# Patient Record
Sex: FEMALE | Race: WHITE | NOT HISPANIC OR LATINO | Employment: UNEMPLOYED | URBAN - METROPOLITAN AREA
[De-identification: names, ages, dates, MRNs, and addresses within clinical notes are randomized per-mention and may not be internally consistent; named-entity substitution may affect disease eponyms.]

---

## 2017-05-22 ENCOUNTER — APPOINTMENT (OUTPATIENT)
Dept: LAB | Facility: CLINIC | Age: 37
End: 2017-05-22
Payer: COMMERCIAL

## 2017-05-22 ENCOUNTER — ALLSCRIPTS OFFICE VISIT (OUTPATIENT)
Dept: OTHER | Facility: OTHER | Age: 37
End: 2017-05-22

## 2017-05-22 DIAGNOSIS — K30 FUNCTIONAL DYSPEPSIA: ICD-10-CM

## 2017-05-22 DIAGNOSIS — K76.9 LIVER DISEASE: ICD-10-CM

## 2017-05-22 LAB
ALBUMIN SERPL BCP-MCNC: 3.8 G/DL (ref 3.5–5)
ALP SERPL-CCNC: 70 U/L (ref 46–116)
ALT SERPL W P-5'-P-CCNC: 20 U/L (ref 12–78)
ANION GAP SERPL CALCULATED.3IONS-SCNC: 11 MMOL/L (ref 4–13)
AST SERPL W P-5'-P-CCNC: 14 U/L (ref 5–45)
BACTERIA UR QL AUTO: NORMAL /HPF
BASOPHILS # BLD AUTO: 0.04 THOUSANDS/ΜL (ref 0–0.1)
BASOPHILS NFR BLD AUTO: 1 % (ref 0–1)
BILIRUB SERPL-MCNC: 0.22 MG/DL (ref 0.2–1)
BILIRUB UR QL STRIP: NEGATIVE
BUN SERPL-MCNC: 12 MG/DL (ref 5–25)
CALCIUM SERPL-MCNC: 9.4 MG/DL (ref 8.3–10.1)
CHLORIDE SERPL-SCNC: 108 MMOL/L (ref 100–108)
CLARITY UR: CLEAR
CO2 SERPL-SCNC: 24 MMOL/L (ref 21–32)
COLOR UR: YELLOW
CREAT SERPL-MCNC: 0.46 MG/DL (ref 0.6–1.3)
EOSINOPHIL # BLD AUTO: 0.13 THOUSAND/ΜL (ref 0–0.61)
EOSINOPHIL NFR BLD AUTO: 2 % (ref 0–6)
ERYTHROCYTE [DISTWIDTH] IN BLOOD BY AUTOMATED COUNT: 16.6 % (ref 11.6–15.1)
GFR SERPL CREATININE-BSD FRML MDRD: >60 ML/MIN/1.73SQ M
GLUCOSE SERPL-MCNC: 82 MG/DL (ref 65–140)
GLUCOSE UR STRIP-MCNC: NEGATIVE MG/DL
HCT VFR BLD AUTO: 34.2 % (ref 34.8–46.1)
HGB BLD-MCNC: 10.6 G/DL (ref 11.5–15.4)
HGB UR QL STRIP.AUTO: ABNORMAL
KETONES UR STRIP-MCNC: NEGATIVE MG/DL
LEUKOCYTE ESTERASE UR QL STRIP: NEGATIVE
LYMPHOCYTES # BLD AUTO: 2.8 THOUSANDS/ΜL (ref 0.6–4.47)
LYMPHOCYTES NFR BLD AUTO: 47 % (ref 14–44)
MCH RBC QN AUTO: 23.8 PG (ref 26.8–34.3)
MCHC RBC AUTO-ENTMCNC: 31 G/DL (ref 31.4–37.4)
MCV RBC AUTO: 77 FL (ref 82–98)
MONOCYTES # BLD AUTO: 0.49 THOUSAND/ΜL (ref 0.17–1.22)
MONOCYTES NFR BLD AUTO: 8 % (ref 4–12)
NEUTROPHILS # BLD AUTO: 2.48 THOUSANDS/ΜL (ref 1.85–7.62)
NEUTS SEG NFR BLD AUTO: 42 % (ref 43–75)
NITRITE UR QL STRIP: NEGATIVE
NON-SQ EPI CELLS URNS QL MICRO: NORMAL /HPF
NRBC BLD AUTO-RTO: 0 /100 WBCS
PH UR STRIP.AUTO: 5.5 [PH] (ref 4.5–8)
PLATELET # BLD AUTO: 279 THOUSANDS/UL (ref 149–390)
PMV BLD AUTO: 11.4 FL (ref 8.9–12.7)
POTASSIUM SERPL-SCNC: 3.6 MMOL/L (ref 3.5–5.3)
PROT SERPL-MCNC: 7.7 G/DL (ref 6.4–8.2)
PROT UR STRIP-MCNC: NEGATIVE MG/DL
RBC # BLD AUTO: 4.45 MILLION/UL (ref 3.81–5.12)
RBC #/AREA URNS AUTO: NORMAL /HPF
SODIUM SERPL-SCNC: 143 MMOL/L (ref 136–145)
SP GR UR STRIP.AUTO: 1.03 (ref 1–1.03)
TSH SERPL DL<=0.05 MIU/L-ACNC: 0.85 UIU/ML (ref 0.36–3.74)
UROBILINOGEN UR QL STRIP.AUTO: 0.2 E.U./DL
WBC # BLD AUTO: 5.95 THOUSAND/UL (ref 4.31–10.16)
WBC #/AREA URNS AUTO: NORMAL /HPF

## 2017-05-22 PROCEDURE — 80053 COMPREHEN METABOLIC PANEL: CPT

## 2017-05-22 PROCEDURE — 36415 COLL VENOUS BLD VENIPUNCTURE: CPT

## 2017-05-22 PROCEDURE — 85025 COMPLETE CBC W/AUTO DIFF WBC: CPT

## 2017-05-22 PROCEDURE — 84443 ASSAY THYROID STIM HORMONE: CPT

## 2017-05-22 PROCEDURE — 81001 URINALYSIS AUTO W/SCOPE: CPT

## 2017-05-30 ENCOUNTER — HOSPITAL ENCOUNTER (OUTPATIENT)
Dept: ULTRASOUND IMAGING | Facility: CLINIC | Age: 37
Discharge: HOME | End: 2017-05-30
Payer: COMMERCIAL

## 2017-05-30 DIAGNOSIS — K30 FUNCTIONAL DYSPEPSIA: ICD-10-CM

## 2017-05-30 PROCEDURE — 76705 ECHO EXAM OF ABDOMEN: CPT

## 2017-06-01 ENCOUNTER — TRANSCRIBE ORDERS (OUTPATIENT)
Dept: ADMINISTRATIVE | Facility: HOSPITAL | Age: 37
End: 2017-06-01

## 2017-06-01 DIAGNOSIS — K76.0 FATTY METAMORPHOSIS OF LIVER: Primary | ICD-10-CM

## 2017-06-05 ENCOUNTER — ALLSCRIPTS OFFICE VISIT (OUTPATIENT)
Dept: OTHER | Facility: OTHER | Age: 37
End: 2017-06-05

## 2017-06-19 ENCOUNTER — HOSPITAL ENCOUNTER (OUTPATIENT)
Dept: MRI IMAGING | Facility: CLINIC | Age: 37
Discharge: HOME | End: 2017-06-19
Payer: COMMERCIAL

## 2017-08-08 ENCOUNTER — GENERIC CONVERSION - ENCOUNTER (OUTPATIENT)
Dept: OTHER | Facility: OTHER | Age: 37
End: 2017-08-08

## 2018-01-14 VITALS
WEIGHT: 152.25 LBS | HEIGHT: 66 IN | BODY MASS INDEX: 24.47 KG/M2 | HEART RATE: 60 BPM | SYSTOLIC BLOOD PRESSURE: 100 MMHG | DIASTOLIC BLOOD PRESSURE: 60 MMHG

## 2018-01-14 VITALS
WEIGHT: 155.13 LBS | HEART RATE: 68 BPM | SYSTOLIC BLOOD PRESSURE: 90 MMHG | OXYGEN SATURATION: 95 % | BODY MASS INDEX: 24.35 KG/M2 | TEMPERATURE: 97.7 F | DIASTOLIC BLOOD PRESSURE: 66 MMHG | HEIGHT: 67 IN

## 2018-11-06 ENCOUNTER — OFFICE VISIT (OUTPATIENT)
Dept: INTERNAL MEDICINE CLINIC | Facility: CLINIC | Age: 38
End: 2018-11-06
Payer: COMMERCIAL

## 2018-11-06 VITALS
TEMPERATURE: 98.2 F | HEART RATE: 76 BPM | RESPIRATION RATE: 12 BRPM | WEIGHT: 159.8 LBS | SYSTOLIC BLOOD PRESSURE: 128 MMHG | BODY MASS INDEX: 25.79 KG/M2 | DIASTOLIC BLOOD PRESSURE: 78 MMHG | OXYGEN SATURATION: 99 %

## 2018-11-06 DIAGNOSIS — M79.641 PAIN OF RIGHT HAND: ICD-10-CM

## 2018-11-06 DIAGNOSIS — M54.10 RADICULOPATHY, UNSPECIFIED SPINAL REGION: Primary | ICD-10-CM

## 2018-11-06 PROBLEM — K30 NON-ULCER DYSPEPSIA: Status: ACTIVE | Noted: 2017-05-22

## 2018-11-06 PROBLEM — M54.32 SCIATICA OF LEFT SIDE: Status: ACTIVE | Noted: 2018-11-06

## 2018-11-06 PROCEDURE — 99214 OFFICE O/P EST MOD 30 MIN: CPT | Performed by: PHYSICIAN ASSISTANT

## 2018-11-06 PROCEDURE — 1036F TOBACCO NON-USER: CPT | Performed by: PHYSICIAN ASSISTANT

## 2018-11-06 NOTE — PROGRESS NOTES
Assessment/Plan:  Right chest wall pain and right shoulder and arm pain only when she lies down at night physical exam is unremarkable possibly radicular will x-ray her spine  She has lab work pending from her 's cardiologist 2 week follow-up  Physical exam is unremarkable recommend ibuprofen or Tylenol at night consider changing her mattress and pillow  X-ray right hand because what could be a ganglion right proximal 1st metacarpal       Diagnoses and all orders for this visit:    Radiculopathy, unspecified spinal region  -     XR spine cervical complete 4 or 5 vw non injury; Future  -     XR spine thoracic 3 vw; Future  -     XR hand 3+ vw right; Future    Pain of right hand  -     XR spine cervical complete 4 or 5 vw non injury; Future  -     XR spine thoracic 3 vw; Future  -     XR hand 3+ vw right; Future        No problem-specific Assessment & Plan notes found for this encounter  Subjective:      Patient ID: Kieran Gonzalez is a 41 y o  female  She has had chest pain right side mid back under her right breast when she lies down at night which interferes with her sleep also similar pain in her right shoulder  She feels fine during the day she is currently asymptomatic she is unable to reproduce the pain no skin rashes no muscle weakness  She feels well otherwise normally active      Back Pain   Pertinent negatives include no abdominal pain, chest pain, dysuria, fever, headaches, numbness or weakness  The following portions of the patient's history were reviewed and updated as appropriate:   She has a past medical history of Sciatica of left side  ,   does not have any pertinent problems on file  ,   has a past surgical history that includes  section  ,  family history includes Heart attack in her father  ,   reports that she has never smoked  She has never used smokeless tobacco  She reports that she does not drink alcohol  Her drug history is not on file  ,  has No Known Allergies     Current Outpatient Prescriptions   Medication Sig Dispense Refill    omeprazole (PriLOSEC) 20 mg delayed release capsule Take 1 capsule by mouth Daily      ascorbic acid (VITAMIN C) 1000 MG tablet Take by mouth      DOCOSAHEXAENOIC ACID PO Take by mouth      nitrofurantoin (MACROBID) 100 mg capsule Take 100 mg by mouth       No current facility-administered medications for this visit  Review of Systems   Constitutional: Negative for activity change, appetite change, chills, diaphoresis, fatigue, fever and unexpected weight change  HENT: Negative for congestion, dental problem, drooling, ear discharge, ear pain, facial swelling, hearing loss, nosebleeds, postnasal drip, rhinorrhea, sinus pain, sinus pressure, sneezing, sore throat, tinnitus, trouble swallowing and voice change  Eyes: Negative for photophobia, pain, discharge, redness, itching and visual disturbance  Respiratory: Negative for apnea, cough, choking, chest tightness, shortness of breath, wheezing and stridor  Cardiovascular: Negative for chest pain, palpitations and leg swelling  Gastrointestinal: Negative for abdominal distention, abdominal pain, anal bleeding, blood in stool, constipation, diarrhea, nausea, rectal pain and vomiting  Endocrine: Negative for cold intolerance, heat intolerance, polydipsia, polyphagia and polyuria  Genitourinary: Negative for decreased urine volume, difficulty urinating, dysuria, enuresis, flank pain, frequency, genital sores, hematuria and urgency  Musculoskeletal: Positive for back pain  Negative for arthralgias, gait problem, joint swelling, myalgias, neck pain and neck stiffness  Skin: Negative for color change, pallor, rash and wound  Neurological: Negative for dizziness, tremors, seizures, syncope, facial asymmetry, speech difficulty, weakness, light-headedness, numbness and headaches  Hematological: Negative for adenopathy  Does not bruise/bleed easily  Psychiatric/Behavioral: Negative for agitation, behavioral problems, confusion, decreased concentration, dysphoric mood, hallucinations, self-injury, sleep disturbance and suicidal ideas  The patient is not nervous/anxious and is not hyperactive  Objective:  Vitals:    11/06/18 1119   BP: 128/78   BP Location: Left arm   Patient Position: Sitting   Pulse: 76   Resp: 12   Temp: 98 2 °F (36 8 °C)   TempSrc: Oral   SpO2: 99%   Weight: 72 5 kg (159 lb 12 8 oz)     Body mass index is 25 79 kg/m²  Physical Exam   Constitutional: She is oriented to person, place, and time  She appears well-developed  HENT:   Head: Normocephalic  Right Ear: External ear normal    Left Ear: External ear normal    Mouth/Throat: Oropharynx is clear and moist    Eyes: Pupils are equal, round, and reactive to light  Conjunctivae are normal    Neck: Neck supple  No thyromegaly present  Cardiovascular: Normal rate, regular rhythm, normal heart sounds and intact distal pulses  No murmur heard  Pulmonary/Chest: Effort normal and breath sounds normal  No respiratory distress  She exhibits no tenderness  Abdominal: Soft  Bowel sounds are normal  She exhibits no distension and no mass  There is no tenderness  Musculoskeletal: Normal range of motion  She exhibits no edema, tenderness or deformity  Suggestive of a bony prominence proximal end of her right 1st metacarpal range of motion of her neck and right shoulder normal without pain   Neurological: She is alert and oriented to person, place, and time  She has normal reflexes  She displays normal reflexes  No cranial nerve deficit  She exhibits normal muscle tone  Coordination normal    Range of motion of her spine normal in all directions without pain   Skin: Skin is warm and dry

## 2019-07-03 ENCOUNTER — OFFICE VISIT (OUTPATIENT)
Dept: INTERNAL MEDICINE CLINIC | Facility: CLINIC | Age: 39
End: 2019-07-03
Payer: COMMERCIAL

## 2019-07-03 VITALS
OXYGEN SATURATION: 98 % | TEMPERATURE: 98.2 F | DIASTOLIC BLOOD PRESSURE: 62 MMHG | SYSTOLIC BLOOD PRESSURE: 98 MMHG | HEART RATE: 79 BPM | BODY MASS INDEX: 25.92 KG/M2 | WEIGHT: 160.6 LBS

## 2019-07-03 DIAGNOSIS — L57.0 KERATOSIS: Primary | ICD-10-CM

## 2019-07-03 PROCEDURE — 99213 OFFICE O/P EST LOW 20 MIN: CPT | Performed by: PHYSICIAN ASSISTANT

## 2019-07-03 PROCEDURE — 1036F TOBACCO NON-USER: CPT | Performed by: PHYSICIAN ASSISTANT

## 2019-07-03 NOTE — PROGRESS NOTES
Assessment/Plan: a few skin tags on her face which she would like removed have referred her to [facility]  Diagnoses and all orders for this visit:    Keratosis  -     Ambulatory referral to Dermatology; Future        No problem-specific Assessment & Plan notes found for this encounter  Subjective:      Patient ID: Catarino Gutierrez is a 46 y o  female  She is here because she has developed a few bumps on her face which she would like removed  No skin rashes she feels well she has no complaints normally active      The following portions of the patient's history were reviewed and updated as appropriate:   She has a past medical history of Sciatica of left side  ,  does not have any pertinent problems on file  ,   has a past surgical history that includes  section  ,  family history includes Heart attack in her father  ,   reports that she has never smoked  She has never used smokeless tobacco  She reports that she does not drink alcohol or use drugs  ,  has No Known Allergies     Current Outpatient Medications   Medication Sig Dispense Refill    ascorbic acid (VITAMIN C) 1000 MG tablet Take by mouth      omeprazole (PriLOSEC) 20 mg delayed release capsule Take 1 capsule by mouth Daily      DOCOSAHEXAENOIC ACID PO Take by mouth      nitrofurantoin (MACROBID) 100 mg capsule Take 100 mg by mouth       No current facility-administered medications for this visit  Review of Systems   Constitutional: Negative for activity change, appetite change, chills, diaphoresis, fatigue, fever and unexpected weight change  HENT: Negative for congestion, dental problem, drooling, ear discharge, ear pain, facial swelling, hearing loss, nosebleeds, postnasal drip, rhinorrhea, sinus pressure, sinus pain, sneezing, sore throat, tinnitus, trouble swallowing and voice change  Eyes: Negative for photophobia, pain, discharge, redness, itching and visual disturbance     Respiratory: Negative for apnea, cough, choking, chest tightness, shortness of breath, wheezing and stridor  Cardiovascular: Negative for chest pain, palpitations and leg swelling  Gastrointestinal: Negative for abdominal distention, abdominal pain, anal bleeding, blood in stool, constipation, diarrhea, nausea, rectal pain and vomiting  Endocrine: Negative for cold intolerance, heat intolerance, polydipsia, polyphagia and polyuria  Genitourinary: Negative for decreased urine volume, difficulty urinating, dysuria, enuresis, flank pain, frequency, genital sores, hematuria and urgency  Musculoskeletal: Negative for arthralgias, back pain, gait problem, joint swelling, myalgias, neck pain and neck stiffness  Skin: Negative for color change, pallor, rash and wound  Neurological: Negative for dizziness, tremors, seizures, syncope, facial asymmetry, speech difficulty, weakness, light-headedness, numbness and headaches  Hematological: Negative for adenopathy  Does not bruise/bleed easily  Psychiatric/Behavioral: Negative for agitation, behavioral problems, confusion, decreased concentration, dysphoric mood, hallucinations, self-injury, sleep disturbance and suicidal ideas  The patient is not nervous/anxious and is not hyperactive  Objective:  Vitals:    07/03/19 1108   BP: 98/62   BP Location: Left arm   Patient Position: Sitting   Cuff Size: Adult   Pulse: 79   Temp: 98 2 °F (36 8 °C)   TempSrc: Tympanic   SpO2: 98%   Weight: 72 8 kg (160 lb 9 6 oz)     Body mass index is 25 92 kg/m²  Physical Exam   Constitutional: She is oriented to person, place, and time  She appears well-developed  HENT:   Head: Normocephalic  Right Ear: External ear normal    Left Ear: External ear normal    Mouth/Throat: Oropharynx is clear and moist    Eyes: Pupils are equal, round, and reactive to light  Conjunctivae are normal    Neck: Neck supple  No thyromegaly present     Cardiovascular: Normal rate, regular rhythm, normal heart sounds and intact distal pulses  Pulmonary/Chest: Effort normal and breath sounds normal    Abdominal: Soft  Bowel sounds are normal    Musculoskeletal: Normal range of motion  Neurological: She is alert and oriented to person, place, and time  She has normal reflexes  Skin: Skin is warm and dry  No pallor      Four or5 scattered tiny raised fleshy stuck on lesions upper face

## 2019-11-19 ENCOUNTER — HOSPITAL ENCOUNTER (EMERGENCY)
Facility: HOSPITAL | Age: 39
Discharge: HOME | End: 2019-11-19
Attending: EMERGENCY MEDICINE | Admitting: EMERGENCY MEDICINE
Payer: COMMERCIAL

## 2019-11-19 VITALS
DIASTOLIC BLOOD PRESSURE: 54 MMHG | WEIGHT: 157.63 LBS | BODY MASS INDEX: 25.44 KG/M2 | HEART RATE: 80 BPM | SYSTOLIC BLOOD PRESSURE: 109 MMHG | RESPIRATION RATE: 17 BRPM | TEMPERATURE: 98.1 F | OXYGEN SATURATION: 100 %

## 2019-11-19 DIAGNOSIS — J02.9 ACUTE PHARYNGITIS: Primary | ICD-10-CM

## 2019-11-19 PROCEDURE — 99284 EMERGENCY DEPT VISIT MOD MDM: CPT | Performed by: PHYSICIAN ASSISTANT

## 2019-11-19 PROCEDURE — 99282 EMERGENCY DEPT VISIT SF MDM: CPT

## 2019-11-19 RX ORDER — IBUPROFEN 400 MG/1
400 TABLET ORAL EVERY 6 HOURS PRN
Qty: 30 TABLET | Refills: 0 | Status: SHIPPED | OUTPATIENT
Start: 2019-11-19 | End: 2021-07-19 | Stop reason: ALTCHOICE

## 2019-11-19 RX ORDER — AMOXICILLIN 500 MG/1
500 CAPSULE ORAL 3 TIMES DAILY
Qty: 30 CAPSULE | Refills: 0 | Status: SHIPPED | OUTPATIENT
Start: 2019-11-19 | End: 2019-11-29

## 2019-11-19 RX ADMIN — Medication 10 MG: at 16:17

## 2019-11-19 NOTE — ED PROVIDER NOTES
History  Chief Complaint   Patient presents with    Sore Throat     pt started with sore throat last night, worsening this morning, painful to talk and eat       46 y o  female presents to ED with chief complaint of sore throat  Onset of symptoms reported as 1 day ago  Location of symptoms is reported as posterior throat  Quality is reported as aching pain  Severity is reported as moderate  Associated symptoms:    Positive for tactile fevers  denies runny nose/congestion  Denies dysphagia  Denies dysphonia  Denies cough  Denies SOB  Denies Rash  Denies neck pain  Denies lip/tongue/facial swelling  Denies Wheezing  Modifiers:  Swallowing exacerbates pain  Context:    Denies recent travel outside the country  Positive for recent sick contacts  Denies history of frequent throat infections  Medical summary:  Review of past visit history via EPIC demonstrates no prior visits to this ed  History provided by:  Patient   used: No    Sore Throat   Associated symptoms: fever    Associated symptoms: no abdominal pain, no adenopathy, no chest pain, no chills, no cough, no drooling, no ear discharge, no ear pain, no epistaxis, no eye discharge, no headaches, no neck stiffness, no postnasal drip, no rash, no rhinorrhea, no shortness of breath, no stridor, no trouble swallowing and no voice change        Prior to Admission Medications   Prescriptions Last Dose Informant Patient Reported? Taking?    DOCOSAHEXAENOIC ACID PO  Self Yes No   Sig: Take by mouth   ascorbic acid (VITAMIN C) 1000 MG tablet  Self Yes No   Sig: Take by mouth   nitrofurantoin (MACROBID) 100 mg capsule  Self Yes No   Sig: Take 100 mg by mouth   omeprazole (PriLOSEC) 20 mg delayed release capsule  Self Yes No   Sig: Take 1 capsule by mouth Daily      Facility-Administered Medications: None       Past Medical History:   Diagnosis Date    Sciatica of left side        Past Surgical History:   Procedure Laterality Date     SECTION         Family History   Problem Relation Age of Onset    Heart attack Father      I have reviewed and agree with the history as documented  Social History     Tobacco Use    Smoking status: Never Smoker    Smokeless tobacco: Never Used   Substance Use Topics    Alcohol use: No    Drug use: Never        Review of Systems   Constitutional: Positive for fever  Negative for activity change, appetite change, chills, diaphoresis, fatigue and unexpected weight change  HENT: Positive for sore throat  Negative for congestion, dental problem, drooling, ear discharge, ear pain, facial swelling, hearing loss, mouth sores, nosebleeds, postnasal drip, rhinorrhea, sinus pressure, sinus pain, sneezing, tinnitus, trouble swallowing and voice change  Eyes: Negative for photophobia, pain, discharge, redness, itching and visual disturbance  Respiratory: Negative for apnea, cough, choking, chest tightness, shortness of breath, wheezing and stridor  Cardiovascular: Negative for chest pain, palpitations and leg swelling  Gastrointestinal: Negative for abdominal distention, abdominal pain, anal bleeding, blood in stool, constipation, diarrhea, nausea, rectal pain and vomiting  Endocrine: Negative for cold intolerance, heat intolerance, polydipsia, polyphagia and polyuria  Genitourinary: Negative for decreased urine volume, difficulty urinating, dyspareunia, dysuria, enuresis, flank pain, frequency, hematuria, menstrual problem, pelvic pain, urgency, vaginal bleeding, vaginal discharge and vaginal pain  Musculoskeletal: Negative for arthralgias, back pain, gait problem, joint swelling, myalgias, neck pain and neck stiffness  Skin: Negative for color change, pallor, rash and wound  Allergic/Immunologic: Negative for environmental allergies, food allergies and immunocompromised state     Neurological: Negative for dizziness, tremors, seizures, syncope, facial asymmetry, speech difficulty, weakness, light-headedness, numbness and headaches  Hematological: Negative for adenopathy  Does not bruise/bleed easily  Psychiatric/Behavioral: Negative for agitation, confusion, hallucinations, self-injury and suicidal ideas  The patient is not hyperactive  All other systems reviewed and are negative  Physical Exam  Physical Exam   Constitutional: She is oriented to person, place, and time  She appears well-developed and well-nourished  No distress  /54 (BP Location: Left arm)   Pulse 80   Temp 98 1 °F (36 7 °C) (Oral)   Resp 17   Wt 71 5 kg (157 lb 10 1 oz)   LMP 11/18/2019   SpO2 100%   BMI 25 44 kg/m²      HENT:   Head: Normocephalic and atraumatic  Right Ear: External ear normal    Left Ear: External ear normal    Nose: Nose normal    Mouth/Throat: No oropharyngeal exudate  Posterior pharynx injected and erythematous  Uvula midline without deviation  Tonsils erythematous and mildly edematous with purulent exudate present  No drooling  No trismus  Mucous membranes moist and pink with no clinical signs of dehydration  No lip/tongue/facial swelling  No submandibular or sublingual fullness or swelling  Eyes: Conjunctivae and EOM are normal  Right eye exhibits no discharge  Left eye exhibits no discharge  No scleral icterus  Neck: Normal range of motion  Neck supple  No JVD present  No tracheal deviation present  Cardiovascular: Normal rate, regular rhythm and intact distal pulses  Pulmonary/Chest: Effort normal and breath sounds normal  No stridor  No respiratory distress  She has no wheezes  She has no rales  She exhibits no tenderness  Abdominal: Soft  Bowel sounds are normal  She exhibits no distension and no mass  There is no tenderness  There is no rebound and no guarding  No hernia  Musculoskeletal: Normal range of motion  She exhibits no edema, tenderness or deformity  Lymphadenopathy:     She has cervical adenopathy     Neurological: She is alert and oriented to person, place, and time  She displays normal reflexes  No cranial nerve deficit or sensory deficit  She exhibits normal muscle tone  Coordination normal    Skin: Skin is warm and dry  Capillary refill takes less than 2 seconds  No rash noted  She is not diaphoretic  No erythema  No pallor  Psychiatric: She has a normal mood and affect  Her behavior is normal  Judgment and thought content normal    Nursing note and vitals reviewed  Vital Signs  ED Triage Vitals [11/19/19 1540]   Temperature Pulse Respirations Blood Pressure SpO2   98 1 °F (36 7 °C) 80 17 109/54 100 %      Temp Source Heart Rate Source Patient Position - Orthostatic VS BP Location FiO2 (%)   Oral Monitor Sitting Left arm --      Pain Score       --           Vitals:    11/19/19 1540   BP: 109/54   Pulse: 80   Patient Position - Orthostatic VS: Sitting         Visual Acuity      ED Medications  Medications   dexamethasone 10 mg/mL oral liquid 10 mg 1 mL (has no administration in time range)       Diagnostic Studies  Results Reviewed     None                 No orders to display              Procedures  Procedures       ED Course                               MDM  Number of Diagnoses or Management Options  Acute pharyngitis: new and does not require workup  Diagnosis management comments: ddx includes but is not limited to bacterial vs viral pharyngitis, tonsillitis, uvulitis, PTA, viral syndrome, post nasal drip  Seasonal allergies, laryngitis, mono, consider but doubt retropharyngeal abscess, epiglottitis, foreign body ingestion  Discussed diagnosis of pharyngitis with patient  Will treat with amoxicillin  Add cepacol spray for pain relief with motrin  1 dose dexamethasone for pain here in ed  Discussed encourage liquids, soft foods  Follow up with primary care physician and ENT in 3-5 days for recheck and further evaluation of symptoms  Patient with no red flag clinical findings to suggest deep space neck infection    Extensively reviewed reasons to return to the emergency department  Reviewed reasons to return to ed  Patient verbalized understanding of diagnosis and agreement with discharge plan of care as well as understanding of reasons to return to ed  I have reasonably determine that electronically prescribing a controlled substance would be impractical for the patient to obtain the controlled substance prescribed by electronic prescription or would cause an untimely delay resulting in an adverse impact on the patient's medical condition   Amount and/or Complexity of Data Reviewed  Obtain history from someone other than the patient: yes (Sig other)  Review and summarize past medical records: yes    Patient Progress  Patient progress: stable      Disposition  Final diagnoses:   Acute pharyngitis     Time reflects when diagnosis was documented in both MDM as applicable and the Disposition within this note     Time User Action Codes Description Comment    11/19/2019  3:52 PM Claudia Gonzalez Add [J02 9] Acute pharyngitis       ED Disposition     ED Disposition Condition Date/Time Comment    Discharge Stable Tue Nov 19, 2019  3:51 PM [facility] discharge to home/self care  Follow-up Information     Follow up With Specialties Details Why Contact Info Additional Information    Manuel Gardner MD Internal Medicine Call in 1 day for further evaluation of symptoms [address] Emergency Department Emergency Medicine Go to  If symptoms worsen [address] [address] ED, [address] [place] [address], [facility]    Christina Feliz MD Otolaryngology Call in 1 day for further evaluation of symptoms [address] [facility]    [address] Throat Otolaryngology Call in 1 day for further evaluation of symptoms [id number] [facility] [address] [place] [address] Ear, Nose & Throat, [address] [id number]          Patient's Medications   Discharge Prescriptions    AMOXICILLIN (AMOXIL) 500 MG CAPSULE    Take 1 capsule (500 mg total) by mouth 3 (three) times a day for 10 days       Start Date: 11/19/2019End Date: 11/29/2019       Order Dose: 500 mg       Quantity: 30 capsule    Refills: 0    BENZOCAINE-GLYCERIN-DM 5-30-5 %-MG/SPRAY LIQD    Take 1 spray by mouth every 4 (four) hours as needed (sore throat/initial rx ) for up to 5 days       Start Date: 11/19/2019End Date: 11/24/2019       Order Dose: 1 spray       Quantity: 20 2 mL    Refills: 0    IBUPROFEN (MOTRIN) 400 MG TABLET    Take 1 tablet (400 mg total) by mouth every 6 (six) hours as needed for moderate pain (throat pain/initial rx ) for up to 5 days       Start Date: 11/19/2019End Date: 11/24/2019       Order Dose: 400 mg       Quantity: 30 tablet    Refills: 0     No discharge procedures on file      ED Provider  Electronically Signed by           Niecy Sy PA-C  11/19/19 7818

## 2021-03-22 ENCOUNTER — TELEPHONE (OUTPATIENT)
Dept: INTERNAL MEDICINE CLINIC | Facility: CLINIC | Age: 41
End: 2021-03-22

## 2021-03-22 NOTE — TELEPHONE ENCOUNTER
CALLED  AND STATED SHE CAN NOT COME IN TODAY AS SHE IS WORKING IN [address] [employer] AND THEY HAVE FRIENDS COMING TO HOME LATER TODAY, AND STATED THE PAIN IS CLOSE TO HER HIP, NO FEVERS OR NAUSEA OR VOMITING WITH THIS, WAS ADVISED TO CALL IN AM FOR [address] APPT   AND IF THIS GETS WORSE NEED TO GO TO ER

## 2021-03-22 NOTE — TELEPHONE ENCOUNTER
Patient having abdominal pain on rt side after eating  Couldn't come in today I offered appt  Only opening after 3 was on wed I am also calling [facility] to let them know about this call  Told patients  to call tomorrow around 7 to 7:30AM we may be able to get her in tomorrow appts open up then

## 2021-03-23 ENCOUNTER — OFFICE VISIT (OUTPATIENT)
Dept: INTERNAL MEDICINE CLINIC | Facility: CLINIC | Age: 41
End: 2021-03-23
Payer: COMMERCIAL

## 2021-03-23 VITALS
OXYGEN SATURATION: 96 % | BODY MASS INDEX: 22.89 KG/M2 | SYSTOLIC BLOOD PRESSURE: 128 MMHG | HEART RATE: 80 BPM | WEIGHT: 169 LBS | DIASTOLIC BLOOD PRESSURE: 84 MMHG | TEMPERATURE: 96.9 F | HEIGHT: 72 IN

## 2021-03-23 DIAGNOSIS — R14.3 FLATULENCE: ICD-10-CM

## 2021-03-23 DIAGNOSIS — R10.11 RUQ PAIN: Primary | ICD-10-CM

## 2021-03-23 DIAGNOSIS — Z12.31 SCREENING MAMMOGRAM, ENCOUNTER FOR: ICD-10-CM

## 2021-03-23 DIAGNOSIS — R22.32 MASS OF LEFT AXILLA: ICD-10-CM

## 2021-03-23 PROCEDURE — 3725F SCREEN DEPRESSION PERFORMED: CPT | Performed by: NURSE PRACTITIONER

## 2021-03-23 PROCEDURE — 1036F TOBACCO NON-USER: CPT | Performed by: NURSE PRACTITIONER

## 2021-03-23 PROCEDURE — 99213 OFFICE O/P EST LOW 20 MIN: CPT | Performed by: NURSE PRACTITIONER

## 2021-03-23 PROCEDURE — 3008F BODY MASS INDEX DOCD: CPT | Performed by: NURSE PRACTITIONER

## 2021-03-23 RX ORDER — SIMETHICONE 125 MG
125 CAPSULE ORAL EVERY 6 HOURS PRN
Qty: 28 EACH | Refills: 0 | Status: SHIPPED | OUTPATIENT
Start: 2021-03-23 | End: 2021-12-03

## 2021-03-23 RX ORDER — MEDROXYPROGESTERONE ACETATE 10 MG/1
10 TABLET ORAL 3 TIMES DAILY
COMMUNITY
Start: 2021-01-06 | End: 2021-07-19 | Stop reason: ALTCHOICE

## 2021-03-23 RX ORDER — MEDROXYPROGESTERONE ACETATE 10 MG/1
TABLET ORAL
COMMUNITY
Start: 2021-01-12 | End: 2021-07-19 | Stop reason: SDUPTHER

## 2021-03-23 NOTE — PROGRESS NOTES
INTERNAL MEDICINE FOLLOW-UP OFFICE VISIT  [facility]    NAME: Aden Madrigal  AGE: 37 y o  SEX: female    DATE OF ENCOUNTER: 3/24/2021   Assessment and Plan:   Possible lymphadenopathy vs lipoma under left axilla  Patient to complete us of left axilla  RUQ is relieved with passing gas  Will start simethicone prn for gas pains  If no improvement in pains patient will complete abdominal xray  Advised on cutting back on gas producing vegetables  Problem List Items Addressed This Visit     None      Visit Diagnoses     RUQ pain    -  Primary    Relevant Orders    XR abdomen 1 view kub    Mass of left axilla        Relevant Orders    US axilla    Flatulence        Relevant Medications    simethicone (MYLICON,GAS-X) 235 MG CAPS    Screening mammogram, encounter for        Relevant Orders    Mammo screening bilateral w 3d & cad          No follow-ups on file  Counseling:     · Medication Side Effects - Adverse side effects of medications were reviewed with the patient/guardian today: Yes  · Counseling was given regarding: Prognosis, Risks and benefits of tx options, Intructions for management, Patient and family education, Importance of tx compliance, Risk factor reductions and Impressions  · Barriers to treatment include: No identified barriers      Chief Complaint:     Chief Complaint   Patient presents with    Abdominal Pain     Right side        History of Present Illness:     1 mos RLQ pain comes and goes  Patient states at times it is very sharp, when she passes gas the pain goes away  She does admit to eating a lot of onion and she eats broccoli and cauliflower  No changes in diet, no n/v/d  Sometimes goes 2-3 days without a bowel movement  Also has concerns over a lump in left arm pit  States she had this when she was pregnant and was given medicaiton and it went away  It is not painful to touch          The following portions of the patient's history were reviewed and updated as appropriate: allergies, current medications, past family history, past medical history, past social history, past surgical history and problem list      Review of Systems:     Review of Systems   Constitutional: Negative for chills, fatigue and fever  Gastrointestinal: Positive for abdominal pain  Negative for constipation, diarrhea, nausea and vomiting  Musculoskeletal: Negative for myalgias, neck pain and neck stiffness  Skin: Negative for color change, rash and wound  Lump under left arm pit     Psychiatric/Behavioral: Negative for sleep disturbance  Problem List:     Patient Active Problem List   Diagnosis    Non-ulcer dyspepsia    Sciatica of left side        Objective:     /84 (BP Location: Left arm, Patient Position: Sitting, Cuff Size: Large)   Pulse 80   Temp (!) 96 9 °F (36 1 °C) (Temporal) Comment: no ndaisd  Ht 6' (1 829 m)   Wt 76 7 kg (169 lb)   SpO2 96%   BMI 22 92 kg/m²     Physical Exam  Vitals signs reviewed  Constitutional:       General: She is not in acute distress  Appearance: Normal appearance  She is not ill-appearing  HENT:      Head: Normocephalic and atraumatic  Cardiovascular:      Rate and Rhythm: Normal rate and regular rhythm  Heart sounds: Normal heart sounds, S1 normal and S2 normal    Pulmonary:      Effort: Pulmonary effort is normal  No accessory muscle usage  Breath sounds: Normal breath sounds  No wheezing  Abdominal:      General: Abdomen is flat  Bowel sounds are normal       Palpations: Abdomen is soft  Tenderness: There is abdominal tenderness in the right upper quadrant  Lymphadenopathy:      Upper Body:      Left upper body: Axillary adenopathy present  Comments: Generalized swelling under left axilla, about size of egg  Soft, no ntender, no erythema or warmth   Skin:     General: Skin is warm and dry  Capillary Refill: Capillary refill takes less than 2 seconds  Findings: No rash  Neurological:      General: No focal deficit present  Mental Status: She is alert and oriented to person, place, and time  Motor: Motor function is intact  Psychiatric:         Attention and Perception: Attention and perception normal          Mood and Affect: Mood and affect normal          Speech: Speech normal          Behavior: Behavior normal  Behavior is cooperative  Thought Content: Thought content normal          Pertinent Laboratory/Diagnostic Studies:    Laboratory Results: I have personally reviewed the pertinent laboratory results/reports   Radiology/Other Diagnostic Testing Results: I have personally reviewed pertinent reports  Current Medications:     Current Outpatient Medications   Medication Sig Dispense Refill    ascorbic acid (VITAMIN C) 1000 MG tablet Take by mouth      ibuprofen (MOTRIN) 400 mg tablet Take 1 tablet (400 mg total) by mouth every 6 (six) hours as needed for moderate pain (throat pain/initial rx ) for up to 5 days 30 tablet 0    DOCOSAHEXAENOIC ACID PO Take by mouth      medroxyPROGESTERone (PROVERA) 10 mg tablet Take 10 mg by mouth Three times a day      medroxyPROGESTERone (PROVERA) 10 mg tablet       omeprazole (PriLOSEC) 20 mg delayed release capsule Take 1 capsule by mouth Daily      simethicone (MYLICON,GAS-X) 535 MG CAPS Take 1 capsule (125 mg total) by mouth every 6 (six) hours as needed for flatulence 28 each 0     No current facility-administered medications for this visit  There are no Patient Instructions on file for this visit      [place], CRNP  [facility]

## 2021-05-11 ENCOUNTER — OFFICE VISIT (OUTPATIENT)
Dept: INTERNAL MEDICINE CLINIC | Facility: CLINIC | Age: 41
End: 2021-05-11
Payer: COMMERCIAL

## 2021-05-11 ENCOUNTER — APPOINTMENT (OUTPATIENT)
Dept: LAB | Facility: CLINIC | Age: 41
End: 2021-05-11
Payer: COMMERCIAL

## 2021-05-11 VITALS
BODY MASS INDEX: 22.02 KG/M2 | HEART RATE: 75 BPM | DIASTOLIC BLOOD PRESSURE: 76 MMHG | SYSTOLIC BLOOD PRESSURE: 122 MMHG | RESPIRATION RATE: 16 BRPM | TEMPERATURE: 98 F | WEIGHT: 162.6 LBS | HEIGHT: 72 IN | OXYGEN SATURATION: 98 %

## 2021-05-11 DIAGNOSIS — R35.0 FREQUENCY OF URINATION: Primary | ICD-10-CM

## 2021-05-11 DIAGNOSIS — R35.0 FREQUENCY OF URINATION: ICD-10-CM

## 2021-05-11 DIAGNOSIS — N30.90 CYSTITIS: ICD-10-CM

## 2021-05-11 PROBLEM — N92.0 MENORRHAGIA WITH REGULAR CYCLE: Status: ACTIVE | Noted: 2019-04-24

## 2021-05-11 PROBLEM — D25.2 INTRAMURAL AND SUBSEROUS LEIOMYOMA OF UTERUS: Status: ACTIVE | Noted: 2021-03-03

## 2021-05-11 PROBLEM — D25.1 INTRAMURAL AND SUBSEROUS LEIOMYOMA OF UTERUS: Status: ACTIVE | Noted: 2021-03-03

## 2021-05-11 PROBLEM — D25.2 SUBSEROUS LEIOMYOMA OF UTERUS: Status: ACTIVE | Noted: 2019-04-24

## 2021-05-11 LAB
BACTERIA UR QL AUTO: ABNORMAL /HPF
BILIRUB UR QL STRIP: NEGATIVE
CLARITY UR: ABNORMAL
COLOR UR: YELLOW
GLUCOSE UR STRIP-MCNC: NEGATIVE MG/DL
HGB UR QL STRIP.AUTO: ABNORMAL
KETONES UR STRIP-MCNC: NEGATIVE MG/DL
LEUKOCYTE ESTERASE UR QL STRIP: ABNORMAL
NITRITE UR QL STRIP: NEGATIVE
NON-SQ EPI CELLS URNS QL MICRO: ABNORMAL /HPF
OTHER STN SPEC: ABNORMAL
PH UR STRIP.AUTO: 5.5 [PH]
PROT UR STRIP-MCNC: NEGATIVE MG/DL
RBC #/AREA URNS AUTO: ABNORMAL /HPF
SP GR UR STRIP.AUTO: 1.02 (ref 1–1.03)
UROBILINOGEN UR QL STRIP.AUTO: 0.2 E.U./DL
WBC #/AREA URNS AUTO: ABNORMAL /HPF

## 2021-05-11 PROCEDURE — 81001 URINALYSIS AUTO W/SCOPE: CPT | Performed by: NURSE PRACTITIONER

## 2021-05-11 PROCEDURE — 99214 OFFICE O/P EST MOD 30 MIN: CPT | Performed by: NURSE PRACTITIONER

## 2021-05-11 PROCEDURE — 87086 URINE CULTURE/COLONY COUNT: CPT

## 2021-05-11 RX ORDER — PHENAZOPYRIDINE HYDROCHLORIDE 100 MG/1
100 TABLET, FILM COATED ORAL 3 TIMES DAILY PRN
Qty: 10 TABLET | Refills: 0 | Status: SHIPPED | OUTPATIENT
Start: 2021-05-11 | End: 2021-12-03

## 2021-05-11 RX ORDER — SULFAMETHOXAZOLE AND TRIMETHOPRIM 800; 160 MG/1; MG/1
1 TABLET ORAL 2 TIMES DAILY
Qty: 6 TABLET | Refills: 0 | Status: SHIPPED | OUTPATIENT
Start: 2021-05-11 | End: 2021-05-14

## 2021-05-11 NOTE — PATIENT INSTRUCTIONS
Urinary Tract Infection in Women   AMBULATORY CARE:   A urinary tract infection (UTI)  is caused by bacteria that get inside your urinary tract  Most bacteria that enter your urinary tract come out when you urinate  If the bacteria stay in your urinary tract, you may get an infection  Your urinary tract includes your kidneys, ureters, bladder, and urethra  Urine is made in your kidneys, and it flows from the ureters to the bladder  Urine leaves the bladder through the urethra  A UTI is more common in your lower urinary tract, which includes your bladder and urethra  Common symptoms include the following:   · Urinating more often or waking from sleep to urinate    · Pain or burning when you urinate    · Pain or pressure in your lower abdomen     · Urine that smells bad    · Blood in your urine    · Leaking urine    Seek care immediately if:   · You are urinating very little or not at all  · You have a high fever with shaking chills  · You have side or back pain that gets worse  Call your doctor if:   · You have a fever  · You do not feel better after 2 days of taking antibiotics  · You are vomiting  · You have questions or concerns about your condition or care  Treatment for a UTI  may include antibiotics to treat a bacterial infection  You may also need medicines to decrease pain and burning, or decrease the urge to urinate often  If you have UTIs often (called recurrent UTIs), you may be given antibiotics to take regularly  You will be given directions for when and how to use antibiotics  The goal is to prevent UTIs but not cause antibiotic resistance by using antibiotics too often  Prevent a UTI:   · Empty your bladder often  Urinate and empty your bladder as soon as you feel the need  Do not hold your urine for long periods of time  · Wipe from front to back after you urinate or have a bowel movement    This will help prevent germs from getting into your urinary tract through your urethra  · Drink liquids as directed  Ask how much liquid to drink each day and which liquids are best for you  You may need to drink more liquids than usual to help flush out the bacteria  Do not drink alcohol, caffeine, or citrus juices  These can irritate your bladder and increase your symptoms  Your healthcare provider may recommend cranberry juice to help prevent a UTI  · Urinate after you have sex  This can help flush out bacteria passed during sex  · Do not douche or use feminine deodorants  These can change the chemical balance in your vagina  · Change sanitary pads or tampons often  This will help prevent germs from getting into your urinary tract  · Talk to your healthcare provider about your birth control method  You may need to change your method if it is increasing your risk for UTIs  · Wear cotton underwear and clothes that are loose  Tight pants and nylon underwear can trap moisture and cause bacteria to grow  · Vaginal estrogen may be recommended  This medicine helps prevent UTIs in women who have gone through menopause or are in reyes-menopause  · Do pelvic muscle exercises often  Pelvic muscle exercises may help you start and stop urinating  Strong pelvic muscles may help you empty your bladder easier  Squeeze these muscles tightly for 5 seconds like you are trying to hold back urine  Then relax for 5 seconds  Gradually work up to squeezing for 10 seconds  Do 3 sets of 15 repetitions a day, or as directed  Follow up with your healthcare provider as directed:  Write down your questions so you remember to ask them during your visits  © Copyright [address] Information is for End User's use only and may not be sold, redistributed or otherwise used for commercial purposes  All illustrations and images included in CareNotes® are the copyrighted property of A D A M , Inc  or [facility] Alex Doan   The above information is an  only   It is not intended as medical advice for individual conditions or treatments  Talk to your doctor, nurse or pharmacist before following any medical regimen to see if it is safe and effective for you  Gas and Bloating   WHAT YOU NEED TO KNOW:   Gas forms inside your body when you eat certain foods or swallow too much air  Bloating is the tight, full feeling you get from too much gas  DISCHARGE INSTRUCTIONS:   Medicines:   · Gas relief medicines: These may help decrease gas pain and bloating  These can be bought without a doctor's order  · Take your medicine as directed  Contact your healthcare provider if you think your medicine is not helping or if you have side effects  Tell him or her if you are allergic to any medicine  Keep a list of the medicines, vitamins, and herbs you take  Include the amounts, and when and why you take them  Bring the list or the pill bottles to follow-up visits  Carry your medicine list with you in case of an emergency  How to manage gas and bloating:   · Keep a log:  Write down what you eat and drink and how often you pass gas each day  · Eat and drink slowly:  Choose foods that do not cause gas, such as meat, poultry, fish, and eggs  Avoid high-fat foods and vegetables or starches that can cause gas  Do not drink carbonated drinks  Add foods back into your diet one at a time after about a week  If the food causes symptoms, avoid it  · Exercise:  Exercise can help relieve gas  · Do not smoke or chew gum: This can cause you to swallow air  · Make sure your dentures fit properly:  Have your dentures fixed if they are loose  Loose dentures can cause you to swallow too much air  Follow up with your healthcare provider as directed:  Write down your questions so you remember to ask them during your visits  Contact your healthcare provider if:   · You have a fever  · You vomit or have diarrhea  · You lose weight without trying      · You have questions or concerns about your condition or care  Return to the emergency department if:   · You have severe abdominal pain  · You have blood in your bowel movement  © Copyright [address] Information is for End User's use only and may not be sold, redistributed or otherwise used for commercial purposes  All illustrations and images included in CareNotes® are the copyrighted property of A D A M , Inc  or [facility] Alex Doan   The above information is an  only  It is not intended as medical advice for individual conditions or treatments  Talk to your doctor, nurse or pharmacist before following any medical regimen to see if it is safe and effective for you

## 2021-05-11 NOTE — PROGRESS NOTES
[facility]    NAME: Lenore Brown  AGE: 37 y o  SEX: female  : 1980     DATE: 2021     Assessment and Plan:     Problem List Items Addressed This Visit        Other    Frequency of urination - Primary       The patient presents to the office today with frequency of urination  She states she is urinating approximately every 10-15 minutes  She is also complaining of some abdominal pain  She continues to follow with gynecology for her uterine fibroids  A urinalysis and culture have been ordered  Patient will be treated with Bactrim ds twice a day for 3 days  Peridium was also ordered for her pelvic pain and pressure  I will contact her with the results of her final urine test         Relevant Medications    sulfamethoxazole-trimethoprim (BACTRIM DS) 800-160 mg per tablet    Other Relevant Orders    UA (URINE) with reflex to Scope    Urine culture      Other Visit Diagnoses     Cystitis        Relevant Medications    phenazopyridine (PYRIDIUM) 100 mg tablet              No follow-ups on file  Chief Complaint:     Chief Complaint   Patient presents with    Pain     BETWEEN HIP AND RIGHT SIDE and also in the abdomen        History of Present Illness:     Slade Eason  Presents to the office today for follow-up  The patient states she is having issues with urinary frequency over the past several days  She was urinating every 10-15 minutes  She denies any burning, fevers or blood in the urine  In addition the patient continues to complain about gas pain  She is not taking the simethicone over-the-counter as instructed  Information was provided to the patient regarding foods that  May be contributing to her excessive gas  In addition the patient is currently fasting for Anabapt[employer] purposes  Urinalysis and culture have been ordered  I will contact her with those results  I have started her on Bactrim to be started after her urinalysis  Review of Systems:     Review of Systems   Constitutional: Negative for activity change, fatigue and fever  HENT: Negative for congestion, hearing loss, rhinorrhea, trouble swallowing and voice change  Eyes: Negative for photophobia, pain, discharge and visual disturbance  Respiratory: Negative for cough, chest tightness and shortness of breath  Cardiovascular: Negative for chest pain, palpitations and leg swelling  Gastrointestinal: Positive for abdominal pain  Negative for blood in stool, constipation, nausea and vomiting  Endocrine: Negative for cold intolerance and heat intolerance  Genitourinary: Positive for frequency and pelvic pain (pressure)  Negative for difficulty urinating, hematuria, urgency, vaginal bleeding and vaginal discharge  Musculoskeletal: Negative for arthralgias and myalgias  Skin: Negative  Neurological: Negative for dizziness, weakness, numbness and headaches  Psychiatric/Behavioral: Negative for decreased concentration  The patient is not nervous/anxious  Problem List:     Patient Active Problem List   Diagnosis    Non-ulcer dyspepsia    Sciatica of left side        Objective:     /76 (BP Location: Left arm, Patient Position: Sitting, Cuff Size: Standard)   Pulse 75   Temp 98 °F (36 7 °C) (Temporal) Comment: no nsaids  Resp 16   Ht 6' (1 829 m)   Wt 73 8 kg (162 lb 9 6 oz)   SpO2 98%   BMI 22 05 kg/m²     Physical Exam  Vitals signs reviewed  Constitutional:       Appearance: Normal appearance  HENT:      Head: Normocephalic  Nose: Nose normal       Mouth/Throat:      Mouth: Mucous membranes are moist       Pharynx: Oropharynx is clear  Eyes:      Extraocular Movements: Extraocular movements intact  Pupils: Pupils are equal, round, and reactive to light  Cardiovascular:      Rate and Rhythm: Normal rate and regular rhythm  Pulmonary:      Effort: Pulmonary effort is normal       Breath sounds: Normal breath sounds  Abdominal:      General: There is no distension  Palpations: Abdomen is soft  There is no mass  Tenderness: There is abdominal tenderness (bilateral upper quadrant)  There is no guarding or rebound  Hernia: No hernia is present  Musculoskeletal: Normal range of motion  Skin:     General: Skin is warm and dry  Neurological:      General: No focal deficit present  Mental Status: She is alert and oriented to person, place, and time  Psychiatric:         Mood and Affect: Mood normal          Behavior: Behavior normal          Thought Content:  Thought content normal          Judgment: Judgment normal          [facility], [address] [place] [address]

## 2021-05-11 NOTE — ASSESSMENT & PLAN NOTE
The patient presents to the office today with frequency of urination  She states she is urinating approximately every 10-15 minutes  She is also complaining of some abdominal pain  She continues to follow with gynecology for her uterine fibroids  A urinalysis and culture have been ordered  Patient will be treated with Bactrim ds twice a day for 3 days  Peridium was also ordered for her pelvic pain and pressure    I will contact her with the results of her final urine test

## 2021-05-12 LAB — BACTERIA UR CULT: NORMAL

## 2021-05-14 ENCOUNTER — TELEPHONE (OUTPATIENT)
Dept: INTERNAL MEDICINE CLINIC | Facility: CLINIC | Age: 41
End: 2021-05-14

## 2021-05-15 NOTE — TELEPHONE ENCOUNTER
Urine test inconclusive regarding infection  She was given Bactrim  Did she take it? Has anything changed?

## 2021-05-18 ENCOUNTER — HOSPITAL ENCOUNTER (OUTPATIENT)
Dept: MAMMOGRAPHY | Facility: CLINIC | Age: 41
Discharge: HOME | End: 2021-05-18
Payer: COMMERCIAL

## 2021-05-18 VITALS — WEIGHT: 162 LBS | HEIGHT: 72 IN | BODY MASS INDEX: 21.94 KG/M2

## 2021-05-18 DIAGNOSIS — Z12.31 SCREENING MAMMOGRAM, ENCOUNTER FOR: ICD-10-CM

## 2021-05-18 PROCEDURE — 77067 SCR MAMMO BI INCL CAD: CPT

## 2021-05-18 PROCEDURE — 77063 BREAST TOMOSYNTHESIS BI: CPT

## 2021-05-25 ENCOUNTER — HOSPITAL ENCOUNTER (OUTPATIENT)
Dept: ULTRASOUND IMAGING | Facility: CLINIC | Age: 41
Discharge: HOME | End: 2021-05-25
Payer: COMMERCIAL

## 2021-05-25 DIAGNOSIS — R92.8 ABNORMAL MAMMOGRAM: ICD-10-CM

## 2021-05-25 PROCEDURE — 76642 ULTRASOUND BREAST LIMITED: CPT

## 2021-05-25 NOTE — PROGRESS NOTES
Met with patient, patient's spouse and Dr Hernán Palacios   regarding recommendation for;    __X___ RIGHT ______LEFT      __X___Ultrasound guided  ______Stereotactic breast biopsy  __X___Verbalized understanding        Blood thinners:  _____yes __X___no    Date stopped: ___N/A____    Biopsy teaching sheet given:  __X___yes ____no    Pt given contact information and adv to call with any questions/needs

## 2021-06-01 ENCOUNTER — HOSPITAL ENCOUNTER (OUTPATIENT)
Dept: ULTRASOUND IMAGING | Facility: CLINIC | Age: 41
Discharge: HOME | End: 2021-06-01
Admitting: RADIOLOGY
Payer: COMMERCIAL

## 2021-06-01 ENCOUNTER — HOSPITAL ENCOUNTER (OUTPATIENT)
Dept: MAMMOGRAPHY | Facility: CLINIC | Age: 41
Discharge: HOME | End: 2021-06-01

## 2021-06-01 VITALS — DIASTOLIC BLOOD PRESSURE: 61 MMHG | HEART RATE: 72 BPM | SYSTOLIC BLOOD PRESSURE: 104 MMHG

## 2021-06-01 DIAGNOSIS — R92.8 ABNORMAL MAMMOGRAM: ICD-10-CM

## 2021-06-01 PROCEDURE — 88305 TISSUE EXAM BY PATHOLOGIST: CPT | Performed by: PATHOLOGY

## 2021-06-01 PROCEDURE — 88342 IMHCHEM/IMCYTCHM 1ST ANTB: CPT | Performed by: PATHOLOGY

## 2021-06-01 PROCEDURE — 88341 IMHCHEM/IMCYTCHM EA ADD ANTB: CPT | Performed by: PATHOLOGY

## 2021-06-01 PROCEDURE — A4648 IMPLANTABLE TISSUE MARKER: HCPCS

## 2021-06-01 PROCEDURE — 19083 BX BREAST 1ST LESION US IMAG: CPT

## 2021-06-01 RX ORDER — LIDOCAINE HYDROCHLORIDE 10 MG/ML
5 INJECTION, SOLUTION EPIDURAL; INFILTRATION; INTRACAUDAL; PERINEURAL ONCE
Status: COMPLETED | OUTPATIENT
Start: 2021-06-01 | End: 2021-06-01

## 2021-06-01 RX ADMIN — LIDOCAINE HYDROCHLORIDE 5 ML: 10 INJECTION, SOLUTION EPIDURAL; INFILTRATION; INTRACAUDAL; PERINEURAL at 09:43

## 2021-06-01 NOTE — PROGRESS NOTES
Ice pack given:    __x___yes _____no    Discharge instructions signed by patient:    __x___yes _____no    Discharge instructions given to patient:    __x___yes _____no    Discharged via:    __x___ambulatory    _____wheelchair    _____stretcher    Stable on discharge:    __x___yes ____no  Patient would like results over the phone  Ok to leave a message  Patients  was present for post-op instructions

## 2021-06-01 NOTE — DISCHARGE INSTR - OTHER ORDERS
POST LARGE CORE BREAST BIOPSY PATIENT INFORMATION      1  Place an ice pack inside your bra over the top of the dressing every hour for 20 minutes (20 minutes on, 60 minutes off)  Do this until bedtime  2  Do not shower or bathe until the following morning  3  You may bathe your breast carefully with the steri-strips in place  Be careful    Not to loosen them  The steri-strips will fall off in 3-5 days  4  You may have mild discomfort, and you may have some bruising where the   Needle entered the skin  This should clear within 5-7 days  5  If you need medicine for discomfort, take acetaminophen products such as   Tylenol  You may also take Advil or Motrin products  6  Do not participate in strenuous activities such as-tennis, aerobics, skiing,  Weight lifting, etc  for 24 hours  Refrain from swimming/soaking for 72 hours  7  Wearing a bra for sleeping may be more comfortable for the first 24-48 hours  8  Watch for continued bleeding, pain or fever over 101; please call with any questions or concerns  For procedures done at the [facility]  [facility] call:  Zelda Correa RN at [place] [address] RN at [phone]                    *After 4 PM call the Interventional Radiology Department                    [phone] and ask to speak with the nurse on call  For procedures done at the [facility] call:         Preeti Schaefer RN at   *After 4 PM call the Interventional Radiology Department   [phone] and ask to speak with the nurse on call  For procedures done at [address] [place] [address] call: The Radiology Nurse at [phone]  *After 4 PM call your physician, or go to the Emergency Department  9          The final results of your biopsy are usually available within one week

## 2021-06-02 NOTE — PROGRESS NOTES
Post procedure call completed    Bleeding: _____yes __X___no    Pain: _____yes ___X___no (used ice, no OTC meds needed)    Redness/Swelling: ______yes ___X___no    Band aid removed: __X___yes _____no(instructed to remove today)    Steri-Strips intact: ___X___yes _____no (discussed with patient to remove steri strips on Sunday if they have not come off on their own)    Pt with no questions at this time, adv will call when results available, adv to call with any questions or concerns, has name/# for contact

## 2021-06-07 ENCOUNTER — TELEPHONE (OUTPATIENT)
Dept: MAMMOGRAPHY | Facility: CLINIC | Age: 41
End: 2021-06-07

## 2021-06-07 ENCOUNTER — TELEPHONE (OUTPATIENT)
Dept: SURGICAL ONCOLOGY | Facility: CLINIC | Age: 41
End: 2021-06-07

## 2021-06-07 NOTE — TELEPHONE ENCOUNTER
Call received from patient's  and he was given breast biopsy results, questions answered, adv next step is to make appt with breast surgeon, options discussed of Dr Dionne Deshpande and Dr Boris Guerrero at the [facility], patient's  given [phone] phone number and he is going to make appointment for follow up, pt's  with no further questions at this time, has my name/# for any further needs/concerns

## 2021-06-07 NOTE — TELEPHONE ENCOUNTER
New Patient Breast Form   Patient Details:     Rohini Tena     1980     [id number]     Background Information:   [address] starts by opening a telephone encounter and gathering the following information   Who is calling to schedule and relationship? Spouse   [phone]   Referring Provider [facility]   To which speciality is the referral?  Surgical Oncology   Reason for Visit? 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 Non-Cancer   Is there a confimed diagnosis from biopsy/tissue reviewed by Pathology? Yes   Date of Tissue Diagnosis (If done outside of [facility] please obtain report and slides) 6/1   Is patient aware of diagnosis, and who made them aware? Yes   If no tissue diagnosis, please stop and discuss with Navigator prior to scheduling   When was the diagnosis made? 6/7   Were outside slides requested  No   If biopsy done externally, obtain reports and slides for internal review   Have you had any imaging or labs done? Yes   If YES, when and  where was the blood work and imaging done? [facility]   If outside of [facility] obtain records   Was the patient told to bring a disk? No   Are records in EPIC? Yes   Is there a personal history of cancer? No   If YES please list type and YR diagnosed na   If patient has a prior history of breast cancer were old records obtained? No   Have you ever had genetic testing done for breast cancer? If so, can you please bring a copy to appointment for timely treatment planning No   Is there a family history of cancer? No   If YES please list type na   Scheduling Information:   [address]   Are there any days the patient cannot be seen? Wednesday, Thursday, friday    How was New Patient Packet Sent? [employer] Postal [employer]   Miscellaneous: Monday ad Tuesday pt has off  Request sent to Dr Laura Muniz RN for scheduling direction  Will contact pt spouse once received      After completing the above information, please route to finance, nurse navigation and clinical trials for review

## 2021-06-15 ENCOUNTER — TELEPHONE (OUTPATIENT)
Dept: SURGICAL ONCOLOGY | Facility: CLINIC | Age: 41
End: 2021-06-15

## 2021-06-17 ENCOUNTER — OFFICE VISIT (OUTPATIENT)
Dept: LAB | Facility: CLINIC | Age: 41
End: 2021-06-17
Payer: COMMERCIAL

## 2021-06-17 ENCOUNTER — HOSPITAL ENCOUNTER (OUTPATIENT)
Dept: RADIOLOGY | Facility: HOSPITAL | Age: 41
Discharge: HOME | End: 2021-06-17
Attending: SURGERY
Payer: COMMERCIAL

## 2021-06-17 ENCOUNTER — APPOINTMENT (OUTPATIENT)
Dept: LAB | Facility: CLINIC | Age: 41
End: 2021-06-17
Payer: COMMERCIAL

## 2021-06-17 ENCOUNTER — CONSULT (OUTPATIENT)
Dept: SURGICAL ONCOLOGY | Facility: CLINIC | Age: 41
End: 2021-06-17
Payer: COMMERCIAL

## 2021-06-17 VITALS
BODY MASS INDEX: 22.08 KG/M2 | TEMPERATURE: 98 F | SYSTOLIC BLOOD PRESSURE: 90 MMHG | RESPIRATION RATE: 16 BRPM | HEIGHT: 72 IN | WEIGHT: 163 LBS | DIASTOLIC BLOOD PRESSURE: 60 MMHG | OXYGEN SATURATION: 99 % | HEART RATE: 74 BPM

## 2021-06-17 DIAGNOSIS — N63.10 MASS OF RIGHT BREAST: ICD-10-CM

## 2021-06-17 DIAGNOSIS — N63.10 MASS OF RIGHT BREAST: Primary | ICD-10-CM

## 2021-06-17 LAB
ALBUMIN SERPL BCP-MCNC: 3.7 G/DL (ref 3.5–5)
ALP SERPL-CCNC: 82 U/L (ref 46–116)
ALT SERPL W P-5'-P-CCNC: 19 U/L (ref 12–78)
ANION GAP SERPL CALCULATED.3IONS-SCNC: 10 MMOL/L (ref 4–13)
AST SERPL W P-5'-P-CCNC: 13 U/L (ref 5–45)
ATRIAL RATE: 68 BPM
BASOPHILS # BLD AUTO: 0.08 THOUSANDS/ΜL (ref 0–0.1)
BASOPHILS NFR BLD AUTO: 1 % (ref 0–1)
BILIRUB SERPL-MCNC: 0.31 MG/DL (ref 0.2–1)
BUN SERPL-MCNC: 11 MG/DL (ref 5–25)
CALCIUM SERPL-MCNC: 9.2 MG/DL (ref 8.3–10.1)
CHLORIDE SERPL-SCNC: 105 MMOL/L (ref 100–108)
CO2 SERPL-SCNC: 26 MMOL/L (ref 21–32)
CREAT SERPL-MCNC: 0.51 MG/DL (ref 0.6–1.3)
EOSINOPHIL # BLD AUTO: 0.11 THOUSAND/ΜL (ref 0–0.61)
EOSINOPHIL NFR BLD AUTO: 2 % (ref 0–6)
ERYTHROCYTE [DISTWIDTH] IN BLOOD BY AUTOMATED COUNT: 16 % (ref 11.6–15.1)
GFR SERPL CREATININE-BSD FRML MDRD: 121 ML/MIN/1.73SQ M
GLUCOSE SERPL-MCNC: 85 MG/DL (ref 65–140)
HCT VFR BLD AUTO: 36.1 % (ref 34.8–46.1)
HGB BLD-MCNC: 10.5 G/DL (ref 11.5–15.4)
IMM GRANULOCYTES # BLD AUTO: 0.02 THOUSAND/UL (ref 0–0.2)
IMM GRANULOCYTES NFR BLD AUTO: 0 % (ref 0–2)
LYMPHOCYTES # BLD AUTO: 2.5 THOUSANDS/ΜL (ref 0.6–4.47)
LYMPHOCYTES NFR BLD AUTO: 42 % (ref 14–44)
MCH RBC QN AUTO: 22.2 PG (ref 26.8–34.3)
MCHC RBC AUTO-ENTMCNC: 29.1 G/DL (ref 31.4–37.4)
MCV RBC AUTO: 77 FL (ref 82–98)
MONOCYTES # BLD AUTO: 0.5 THOUSAND/ΜL (ref 0.17–1.22)
MONOCYTES NFR BLD AUTO: 8 % (ref 4–12)
NEUTROPHILS # BLD AUTO: 2.73 THOUSANDS/ΜL (ref 1.85–7.62)
NEUTS SEG NFR BLD AUTO: 47 % (ref 43–75)
NRBC BLD AUTO-RTO: 0 /100 WBCS
P AXIS: 42 DEGREES
PLATELET # BLD AUTO: 325 THOUSANDS/UL (ref 149–390)
PMV BLD AUTO: 10.3 FL (ref 8.9–12.7)
POTASSIUM SERPL-SCNC: 4.1 MMOL/L (ref 3.5–5.3)
PR INTERVAL: 208 MS
PROT SERPL-MCNC: 7.8 G/DL (ref 6.4–8.2)
QRS AXIS: 70 DEGREES
QRSD INTERVAL: 86 MS
QT INTERVAL: 368 MS
QTC INTERVAL: 391 MS
RBC # BLD AUTO: 4.72 MILLION/UL (ref 3.81–5.12)
SODIUM SERPL-SCNC: 141 MMOL/L (ref 136–145)
T WAVE AXIS: 68 DEGREES
VENTRICULAR RATE: 68 BPM
WBC # BLD AUTO: 5.94 THOUSAND/UL (ref 4.31–10.16)

## 2021-06-17 PROCEDURE — 80053 COMPREHEN METABOLIC PANEL: CPT

## 2021-06-17 PROCEDURE — 85025 COMPLETE CBC W/AUTO DIFF WBC: CPT

## 2021-06-17 PROCEDURE — 1036F TOBACCO NON-USER: CPT | Performed by: SURGERY

## 2021-06-17 PROCEDURE — 3008F BODY MASS INDEX DOCD: CPT | Performed by: SURGERY

## 2021-06-17 PROCEDURE — 99245 OFF/OP CONSLTJ NEW/EST HI 55: CPT | Performed by: SURGERY

## 2021-06-17 PROCEDURE — 71046 X-RAY EXAM CHEST 2 VIEWS: CPT

## 2021-06-17 PROCEDURE — 93005 ELECTROCARDIOGRAM TRACING: CPT

## 2021-06-17 PROCEDURE — 36415 COLL VENOUS BLD VENIPUNCTURE: CPT

## 2021-06-17 PROCEDURE — 93010 ELECTROCARDIOGRAM REPORT: CPT | Performed by: INTERNAL MEDICINE

## 2021-06-17 RX ORDER — IBUPROFEN 200 MG
TABLET ORAL EVERY 6 HOURS PRN
COMMUNITY
End: 2021-12-03

## 2021-06-17 RX ORDER — OXYCODONE HYDROCHLORIDE AND ACETAMINOPHEN 5; 325 MG/1; MG/1
1 TABLET ORAL EVERY 6 HOURS PRN
Qty: 6 TABLET | Refills: 0 | Status: SHIPPED | OUTPATIENT
Start: 2021-06-17 | End: 2021-12-03

## 2021-06-17 NOTE — PATIENT INSTRUCTIONS
Please stop all supplements and vitamins one week prior to your surgery  A nurse from the [facility] breast [place] will call you to schedule your JIMBO  clip placement  Breast Lumpectomy   AMBULATORY CARE:   What you need to know about a lumpectomy:  A lumpectomy is surgery to remove a mass in your breast  Breast tissue that surrounds the mass may also be taken  A lumpectomy is also known as breast-conserving surgery, a partial mastectomy, or a segmental mastectomy  How to prepare for a lumpectomy:   · You may need a mammogram or ultrasound before surgery  These tests may be done the same day as your surgery or at an earlier time  Your healthcare provider may use pictures from these tests to elza the location of the mass  The marker will show him where to make your incision  · Your healthcare provider will talk to you about how to prepare for surgery  He may tell you not to eat or drink anything after midnight on the day of your surgery  He will tell you what medicines to take or not take on the day of your surgery  You may need to stop taking blood thinners or aspirin several days before your surgery  Arrange for someone to drive you home and stay with you for 24 hours after surgery  This person can help care for you, and monitor for any problems  What will happen during a lumpectomy:  You will be given general anesthesia to keep you asleep and free from pain during surgery  You may be given an antibiotic through your IV to help prevent a bacterial infection  Your healthcare provider will make an incision in your breast and remove the mass  He may also remove breast tissue or lymph nodes that are close to the mass  A drain may be inserted near your incision to remove extra fluid  This will decrease swelling and help your incision heal  Your healthcare provider will close your incision with stitches or strips of medical tape and cover it with a bandage   He may also wrap a tight-fitting bandage around both of your breasts  This may decrease swelling, bleeding, and pain  What will happen after a lumpectomy:  Healthcare providers will monitor you until you are awake  You may able to go home when you are awake and your pain is controlled  Instead you may need to spend the night in the hospital    Risks of a lumpectomy:  You may bleed more than expected or get an infection  Nerves, blood vessels, and muscles may be damaged during your surgery  You may have swelling in your arm closest to the lumpectomy or where lymph nodes were removed  This swelling is called lymphedema  Lymphedema may cause tingling, numbness, stiffness, and weakness in your arm  This may be permanent  You may get a blood clot in your arm or leg  The blood clot may travel to your heart lungs, or brain  This may become life-threatening  Call 911 for any of the following:   · You feel lightheaded, short of breath, and have chest pain  · You cough up blood  · You have trouble breathing  Seek care immediately if:   · Blood soaks through your bandage  · Your stitches come apart  · Your bruise suddenly gets bigger  · Your leg or arm is larger than normal and painful  Contact your healthcare provider if:   · You have a fever or chills  · Your wound is red, swollen, or draining pus  · You have nausea or are vomiting  · Your skin is itchy, swollen, or you have a rash  · Your pain does not get better after you take pain medicine  · Your drain falls out or stops draining fluid  · Your drain has pus or foul-smelling fluid coming out of it  · You have questions or concerns about your condition or care  Medicines: You may need any of the following:  · Antibiotics  help prevent a bacterial infection  · Prescription pain medicine  may be given  Ask your healthcare provider how to take this medicine safely  Some prescription pain medicines contain acetaminophen   Do not take other medicines that contain acetaminophen without talking to your healthcare provider  Too much acetaminophen may cause liver damage  Prescription pain medicine may cause constipation  Ask your healthcare provider how to prevent or treat constipation  · NSAIDs , such as ibuprofen, help decrease swelling, pain, and fever  NSAIDs can cause stomach bleeding or kidney problems in certain people  If you take blood thinner medicine, always ask your healthcare provider if NSAIDs are safe for you  Always read the medicine label and follow directions  · Take your medicine as directed  Contact your healthcare provider if you think your medicine is not helping or if you have side effects  Tell him or her if you are allergic to any medicine  Keep a list of the medicines, vitamins, and herbs you take  Include the amounts, and when and why you take them  Bring the list or the pill bottles to follow-up visits  Carry your medicine list with you in case of an emergency  Care for your wound as directed: If you have a tight-fitting bandage, you can remove it in 24 to 48 hours, or as directed  Ask your healthcare provider when your incision can get wet  You may need to take a sponge bath until your drain is removed  Carefully wash around the incision with soap and water  It is okay to allow the soap and water to gently run over your incision  Gently pat dry the area and put on new, clean bandages as directed  Change your bandages when they get wet or dirty  Check your incision every day for redness, pus, or swelling  Self-care:   · Apply ice  on your breast for 15 to 20 minutes every hour or as directed  Use an ice pack, or put crushed ice in a plastic bag  Cover it with a towel  Ice helps prevent tissue damage and decreases swelling and pain  · Rest  as directed  Do not lift anything heavy  Do not push or pull with your arms  Take short walks around the house  Gradually walk further as you feel better   Ask your healthcare provider when you can return to your normal activities  · Empty your drain  as directed  You may need to write down how much you empty from your drain each day  Ask your healthcare provider for more information about how to empty your drain  · Wear a supportive bra  as directed  Wait until you remove the tight-fitting bandage to wear a bra  You may be given a surgical bra or told to wear a sports bra  A supportive bra may help hold your bandages in place  It may also help with swelling and pain  Do not  wear bras with lace or underwire  They may rub against your incision and cause discomfort  Arm stretches: Your healthcare provider may show you how to do arm stretches  Arm stretches may prevent stiff arms or shoulders  You may need to wait until after your drains are removed to begin stretching  Do not do arm stretches until your healthcare provider says it is okay  Ask your healthcare provider for more information about arm stretches  Follow up with your healthcare provider as directed:  Write down your questions so you remember to ask them during your visits  © Copyright [address] Information is for End User's use only and may not be sold, redistributed or otherwise used for commercial purposes  All illustrations and images included in CareNotes® are the copyrighted property of A D A M , Inc  or [facility] Alex Doan   The above information is an  only  It is not intended as medical advice for individual conditions or treatments  Talk to your doctor, nurse or pharmacist before following any medical regimen to see if it is safe and effective for you

## 2021-06-17 NOTE — PROGRESS NOTES
Surgical Oncology Consult Note       [address] [facility]  [facility] [place]  [address] [facility] [address]    Reinier Nabeel  1980  [id number]  [address] [facility] [facility]  [facility] [place]  [address] [facility] [address]      Chief Complaint:     Chief Complaint   Patient presents with    Consult     Right breast mass       Assessment and Plan:   Assessment/Plan    the patient presents with a new diagnosis of right breast fibroepithelial neoplasm  The radiologist and pathologist have recommended excision I concur  We will have a JIMBO  clip placed I will perform a right breast JIMBO directed lumpectomy  Patient had other lesions identified within her breast for which bilateral six-month follow-up imaging was recommended  Oncology History:     Oncology History    No history exists  History of Present Illness:    the patient went for a screening mammogram which demonstrated  Mild adenopathy on the left side however she had recently had a COVID vaccine on the left side  There were no abnormalities within the breast   However on the right side there were abnormalities identified at the 1 o'clock position 4 o'clock position and 12 o'clock position  The 1 o'clock position was biopsied and 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  They recommended this lesion be excised  The radiologist have recommended following the other 2 lesions with six-month mammograms and ultrasounds as well as the adenopathy on the left side  Patient presents now for an opinion regarding further management  Review of Systems:   Review of Systems   Constitutional: Negative for activity change, appetite change and fatigue  HENT: Negative  Eyes: Negative  Respiratory: Negative for cough, shortness of breath and wheezing  Cardiovascular: Negative for chest pain and leg swelling  Gastrointestinal: Negative  Endocrine: Negative  Genitourinary: Negative  Musculoskeletal:        No new changes or complaints of bone pain   Skin: Negative  Allergic/Immunologic: Negative  Neurological: Negative  Hematological: Negative  Psychiatric/Behavioral: Negative  Past Medical History:      Patient Active Problem List   Diagnosis    Non-ulcer dyspepsia    Sciatica of left side    Menorrhagia with regular cycle    Intramural and subserous leiomyoma of uterus    Subserous leiomyoma of uterus    Frequency of urination        Past Medical History:   Diagnosis Date    Sciatica of left side         Past Surgical History:   Procedure Laterality Date     SECTION      US GUIDED BREAST BIOPSY RIGHT COMPLETE Right 2021        Family History   Problem Relation Age of Onset    Heart attack Father     Heart disease Father     No Known Problems Sister         Social History     Socioeconomic History    Marital status: /Civil Union     Spouse name: Not on file    Number of children: Not on file    Years of education: Not on file    Highest education level: Not on file   Occupational History    Not on file   Tobacco Use    Smoking status: Never Smoker    Smokeless tobacco: Never Used   Vaping Use    Vaping Use: Never used   Substance and Sexual Activity    Alcohol use: No    Drug use: Never    Sexual activity: Yes     Partners: Male   Other Topics Concern    Not on file   Social History Narrative    Not on file     Social Determinants of Health     Financial Resource Strain:     Difficulty of Paying Living Expenses:    Food Insecurity:     Worried About Running Out of Food in the Last Year:     Ran Out of Food in the Last Year:    Transportation Needs:     Lack of Transportation (Medical):      Lack of Transportation (Non-Medical):    Physical Activity: Inactive    Days of Exercise per Week: 0 days    Minutes of Exercise per Session: 0 min Stress: Stress Concern Present    Feeling of Stress : To some extent   Social Connections:     Frequency of Communication with Friends and Family:     Frequency of Social Gatherings with Friends and Family:     Attends [employer] Services:     Active Member of Clubs or Organizations:     Attends Club or Organization Meetings:     Marital Status:    Intimate Partner Violence:     Fear of Current or Ex-Partner:     Emotionally Abused:     Physically Abused:     Sexually Abused:         Current Outpatient Medications:     ibuprofen (MOTRIN) 200 mg tablet, Take by mouth every 6 (six) hours as needed for mild pain, Disp: , Rfl:     ascorbic acid (VITAMIN C) 1000 MG tablet, Take by mouth as needed , Disp: , Rfl:     DOCOSAHEXAENOIC ACID PO, Take by mouth, Disp: , Rfl:     ibuprofen (MOTRIN) 400 mg tablet, Take 1 tablet (400 mg total) by mouth every 6 (six) hours as needed for moderate pain (throat pain/initial rx ) for up to 5 days, Disp: 30 tablet, Rfl: 0    medroxyPROGESTERone (PROVERA) 10 mg tablet, Take 10 mg by mouth Three times a day, Disp: , Rfl:     medroxyPROGESTERone (PROVERA) 10 mg tablet, , Disp: , Rfl:     omeprazole (PriLOSEC) 20 mg delayed release capsule, Take 1 capsule by mouth Daily, Disp: , Rfl:     phenazopyridine (PYRIDIUM) 100 mg tablet, Take 1 tablet (100 mg total) by mouth 3 (three) times a day as needed for bladder spasms (Patient not taking: Reported on 6/17/2021), Disp: 10 tablet, Rfl: 0    simethicone (MYLICON,GAS-X) 375 MG CAPS, Take 1 capsule (125 mg total) by mouth every 6 (six) hours as needed for flatulence (Patient not taking: Reported on 5/11/2021), Disp: 28 each, Rfl: 0   No Known Allergies    Physical Exam:     Vitals:    06/17/21 0956   BP: 90/60   Pulse: 74   Resp: 16   Temp: 98 °F (36 7 °C)   SpO2: 99%     Physical Exam  Vitals reviewed  Exam conducted with a chaperone present  Constitutional:       Appearance: She is well-developed     HENT:      Head: Normocephalic and atraumatic  Eyes:      Pupils: Pupils are equal, round, and reactive to light  Neck:      Thyroid: No thyromegaly  Vascular: No JVD  Trachea: No tracheal deviation  Cardiovascular:      Rate and Rhythm: Normal rate and regular rhythm  Heart sounds: Normal heart sounds  No murmur heard  No friction rub  No gallop  Pulmonary:      Effort: Pulmonary effort is normal  No respiratory distress  Breath sounds: Normal breath sounds  No wheezing or rales  Chest:      Comments: Both breasts were examined in the sitting and supine position  There are no worrisome skin lesions, no nipple retraction and no nipple discharge  There are no dominant masses, axillary adenopathy or supraclavicular adenopathy on either side  Abdominal:      General: There is no distension  Palpations: Abdomen is soft  There is no hepatomegaly or mass  Tenderness: There is no abdominal tenderness  There is no guarding or rebound  Musculoskeletal:         General: No tenderness  Normal range of motion  Cervical back: Normal range of motion and neck supple  Lymphadenopathy:      Cervical: No cervical adenopathy  Skin:     General: Skin is warm and dry  Findings: No erythema or rash  Neurological:      Mental Status: She is alert and oriented to person, place, and time  Cranial Nerves: No cranial nerve deficit  Psychiatric:         Behavior: Behavior normal          Results:    I reviewed her mammogram post biopsy films  I have also reviewed her mammograms prior to the biopsy there consistent with her clinical exam   Her clip is in good position  Discussion/Summary:     I explained that a fibro epithelial neoplasm with stromal overgrowth could ultimately be shown to be a variety of things from a benign fibroadenoma to a phyllodes benign tumor or possibly though highly unlikely a malignancy    Consequently for definitive diagnosis I have recommended having it removed  I did explain that if it was a phyllodes tumor I would recommended re-excision with wider margins  I recommended a JIMBO directed approach for this  The patient is in agreement with this approach  We will coordinate this for her  The patient and I underwent the process of consent for   Right breast JIMBO  directed lumpectomy  The complications outlined on the consent including relatively minor problems (wound infection, wound healing problems, hematomas, scarring, chronic discomfort/pain), moderate problems (injury to nerves or blood vessels, allergic reactions) and major complications (extensive blood loss requiring transfusions or possible addtional surgeries, cardiac arrest, stroke and possible death)  We also reviewed specific complications as outlined on the consent form including  possible need for additional treatments  All questions were answered to the patient's satisfaction  We will coordinate the surgery at our next mutual convience  Advance Care Planning/Advance Directives:  I discussed the disease status, treatment plans and follow-up with the patient

## 2021-06-23 NOTE — PROGRESS NOTES
Call placed to patient's  regarding recommendation for;    __X___ RIGHT ______LEFT      __X___SAVI  placement  Procedure explained to patient, additional questions answered at this time    __X___Verbalized understanding        Blood thinners:  _____yes __X___no    Date stopped: ___N/A____    All teaching points discussed during call, pt's  with no questions at this time, pt adv to arrive at 1330 for 1400 insertion    Pt's  given name/# for any further questions/needs

## 2021-07-13 ENCOUNTER — HOSPITAL ENCOUNTER (OUTPATIENT)
Dept: ULTRASOUND IMAGING | Facility: CLINIC | Age: 41
Discharge: HOME | End: 2021-07-13

## 2021-07-14 ENCOUNTER — OFFICE VISIT (OUTPATIENT)
Dept: SURGICAL ONCOLOGY | Facility: CLINIC | Age: 41
End: 2021-07-14

## 2021-07-14 VITALS
BODY MASS INDEX: 21.67 KG/M2 | WEIGHT: 160 LBS | TEMPERATURE: 98 F | DIASTOLIC BLOOD PRESSURE: 74 MMHG | SYSTOLIC BLOOD PRESSURE: 98 MMHG | RESPIRATION RATE: 14 BRPM | HEART RATE: 70 BPM | HEIGHT: 72 IN | OXYGEN SATURATION: 98 %

## 2021-07-14 DIAGNOSIS — N63.10 BREAST MASS, RIGHT: ICD-10-CM

## 2021-07-14 DIAGNOSIS — Z01.818 PREOP EXAMINATION: Primary | ICD-10-CM

## 2021-07-14 PROCEDURE — 99024 POSTOP FOLLOW-UP VISIT: CPT | Performed by: SURGERY

## 2021-07-14 PROCEDURE — 3008F BODY MASS INDEX DOCD: CPT | Performed by: SURGERY

## 2021-07-14 NOTE — PROGRESS NOTES
Surgical Oncology Follow Up       [address]  [facility] [place]  [address] [address]    Jorje Limon  1980  [id number]  [address]  [facility] [place]  [address] [address]    Chief Complaint   Patient presents with    Updated History and Physical          Assessment & Plan:     Patient presents for a preoperative visit  She has no complaints referable to her breast   She will have her JIMBO  placed next week  Her clinical exam remains unchanged  We will be removing her right fibroepithelial lesion with a JIMBO directed approach  Cancer History:     Oncology History    No history exists  Interval History:     Patient has had a PATs done her JIMBO clip will be placed on the   She has no questions  Review of Systems:   Review of Systems   All other systems reviewed and are negative        Past Medical History     Patient Active Problem List   Diagnosis    Non-ulcer dyspepsia    Sciatica of left side    Menorrhagia with regular cycle    Intramural and subserous leiomyoma of uterus    Subserous leiomyoma of uterus    Frequency of urination     Past Medical History:   Diagnosis Date    Sciatica of left side      Past Surgical History:   Procedure Laterality Date     SECTION      US GUIDED BREAST BIOPSY RIGHT COMPLETE Right 2021     Family History   Problem Relation Age of Onset    Heart attack Father     Heart disease Father     No Known Problems Sister      Social History     Socioeconomic History    Marital status: /Civil Union     Spouse name: Not on file    Number of children: Not on file    Years of education: Not on file    Highest education level: Not on file   Occupational History    Not on file   Tobacco Use    Smoking status: Never Smoker    Smokeless tobacco: Never Used   Vaping Use    Vaping Use: Never used Substance and Sexual Activity    Alcohol use: No    Drug use: Never    Sexual activity: Yes     Partners: Male   Other Topics Concern    Not on file   Social History Narrative    Not on file     Social Determinants of Health     Financial Resource Strain:     Difficulty of Paying Living Expenses:    Food Insecurity:     Worried About Running Out of Food in the Last Year:     [address] in the Last Year:    Transportation Needs:     Lack of Transportation (Medical):  Lack of Transportation (Non-Medical):    Physical Activity: Inactive    Days of Exercise per Week: 0 days    Minutes of Exercise per Session: 0 min   Stress: Stress Concern Present    Feeling of Stress :  To some extent   Social Connections:     Frequency of Communication with Friends and Family:     Frequency of Social Gatherings with Friends and Family:     Attends Mandaen Services:     Active Member of Clubs or Organizations:     Attends Club or Organization Meetings:     Marital Status:    Intimate Partner Violence:     Fear of Current or Ex-Partner:     Emotionally Abused:     Physically Abused:     Sexually Abused:        Current Outpatient Medications:     ascorbic acid (VITAMIN C) 1000 MG tablet, Take by mouth as needed , Disp: , Rfl:     DOCOSAHEXAENOIC ACID PO, Take by mouth, Disp: , Rfl:     ibuprofen (MOTRIN) 200 mg tablet, Take by mouth every 6 (six) hours as needed for mild pain, Disp: , Rfl:     medroxyPROGESTERone (PROVERA) 10 mg tablet, Take 10 mg by mouth Three times a day, Disp: , Rfl:     medroxyPROGESTERone (PROVERA) 10 mg tablet, , Disp: , Rfl:     omeprazole (PriLOSEC) 20 mg delayed release capsule, Take 1 capsule by mouth Daily, Disp: , Rfl:     phenazopyridine (PYRIDIUM) 100 mg tablet, Take 1 tablet (100 mg total) by mouth 3 (three) times a day as needed for bladder spasms, Disp: 10 tablet, Rfl: 0    simethicone (MYLICON,GAS-X) 065 MG CAPS, Take 1 capsule (125 mg total) by mouth every 6 (six) hours as needed for flatulence, Disp: 28 each, Rfl: 0    ibuprofen (MOTRIN) 400 mg tablet, Take 1 tablet (400 mg total) by mouth every 6 (six) hours as needed for moderate pain (throat pain/initial rx ) for up to 5 days, Disp: 30 tablet, Rfl: 0    oxyCODONE-acetaminophen (PERCOCET) 5-325 mg per tablet, Take 1 tablet by mouth every 6 (six) hours as needed for moderate painMax Daily Amount: 4 tablets (Patient not taking: Reported on 7/14/2021), Disp: 6 tablet, Rfl: 0  No Known Allergies    Physical Exam:     Vitals:    07/14/21 0916   BP: 98/74   Pulse: 70   Resp: 14   Temp: 98 °F (36 7 °C)   SpO2: 98%     Physical Exam  Vitals reviewed  Constitutional:       Appearance: She is well-developed  HENT:      Head: Normocephalic and atraumatic  Eyes:      Pupils: Pupils are equal, round, and reactive to light  Neck:      Thyroid: No thyromegaly  Vascular: No JVD  Trachea: No tracheal deviation  Cardiovascular:      Rate and Rhythm: Normal rate and regular rhythm  Heart sounds: Normal heart sounds  No murmur heard  No friction rub  No gallop  Pulmonary:      Effort: Pulmonary effort is normal  No respiratory distress  Breath sounds: Normal breath sounds  No wheezing or rales  Chest:      Comments: Both breasts were examined in the sitting and supine position  There are no worrisome skin lesions, no nipple retraction and no nipple discharge  There are no dominant masses, axillary adenopathy or supraclavicular adenopathy on either side  Abdominal:      General: There is no distension  Palpations: Abdomen is soft  There is no hepatomegaly or mass  Tenderness: There is no abdominal tenderness  There is no guarding or rebound  Musculoskeletal:         General: No tenderness  Normal range of motion  Cervical back: Normal range of motion and neck supple  Lymphadenopathy:      Cervical: No cervical adenopathy  Skin:     General: Skin is warm and dry  Findings: No erythema or rash  Neurological:      Mental Status: She is alert and oriented to person, place, and time  Cranial Nerves: No cranial nerve deficit  Psychiatric:         Behavior: Behavior normal            Results & Discussion:     Patient's clinical exam is unchanged/ normal    All questions were answered the patient's satisfaction  We are scheduled for right JIMBO directed lumpectomy  Advance Care Planning/Advance Directives:  I discussed the disease status, treatment plans and follow-up with the patient

## 2021-07-14 NOTE — H&P (VIEW-ONLY)
Surgical Oncology Follow Up       [address] [place] [address],[address]  [facility]  [address] [facility] [address]  [facility] [address]    Shadizack Parkmyesha  1980  [id number]  [address],[address] [place] [address]  [facility] [place] [address] [address] [address]    Chief Complaint   Patient presents with    Updated History and Physical          Assessment & Plan:     Patient presents for a preoperative visit  She has no complaints referable to her breast   She will have her JIMBO  placed next week  Her clinical exam remains unchanged  We will be removing her right fibroepithelial lesion with a JIMBO directed approach  Cancer History:     Oncology History    No history exists  Interval History:     Patient has had a PATs done her JIMBO clip will be placed on the   She has no questions  Review of Systems:   Review of Systems   All other systems reviewed and are negative        Past Medical History     Patient Active Problem List   Diagnosis    Non-ulcer dyspepsia    Sciatica of left side    Menorrhagia with regular cycle    Intramural and subserous leiomyoma of uterus    Subserous leiomyoma of uterus    Frequency of urination     Past Medical History:   Diagnosis Date    Sciatica of left side      Past Surgical History:   Procedure Laterality Date     SECTION      US GUIDED BREAST BIOPSY RIGHT COMPLETE Right 2021     Family History   Problem Relation Age of Onset    Heart attack Father     Heart disease Father     No Known Problems Sister      Social History     Socioeconomic History    Marital status: /Civil Union     Spouse name: Not on file    Number of children: Not on file    Years of education: Not on file    Highest education level: Not on file   Occupational History    Not on file   Tobacco Use    Smoking status: Never Smoker    Smokeless tobacco: Never Used   Vaping Use    Vaping Use: Never used Substance and Sexual Activity    Alcohol use: No    Drug use: Never    Sexual activity: Yes     Partners: Male   Other Topics Concern    Not on file   Social History Narrative    Not on file     Social Determinants of Health     Financial Resource Strain:     Difficulty of Paying Living Expenses:    Food Insecurity:     Worried About Running Out of Food in the Last Year:     [address] in the Last Year:    Transportation Needs:     Lack of Transportation (Medical):  Lack of Transportation (Non-Medical):    Physical Activity: Inactive    Days of Exercise per Week: 0 days    Minutes of Exercise per Session: 0 min   Stress: Stress Concern Present    Feeling of Stress :  To some extent   Social Connections:     Frequency of Communication with Friends and Family:     Frequency of Social Gatherings with Friends and Family:     Attends Yazidi Services:     Active Member of Clubs or Organizations:     Attends Club or Organization Meetings:     Marital Status:    Intimate Partner Violence:     Fear of Current or Ex-Partner:     Emotionally Abused:     Physically Abused:     Sexually Abused:        Current Outpatient Medications:     ascorbic acid (VITAMIN C) 1000 MG tablet, Take by mouth as needed , Disp: , Rfl:     DOCOSAHEXAENOIC ACID PO, Take by mouth, Disp: , Rfl:     ibuprofen (MOTRIN) 200 mg tablet, Take by mouth every 6 (six) hours as needed for mild pain, Disp: , Rfl:     medroxyPROGESTERone (PROVERA) 10 mg tablet, Take 10 mg by mouth Three times a day, Disp: , Rfl:     medroxyPROGESTERone (PROVERA) 10 mg tablet, , Disp: , Rfl:     omeprazole (PriLOSEC) 20 mg delayed release capsule, Take 1 capsule by mouth Daily, Disp: , Rfl:     phenazopyridine (PYRIDIUM) 100 mg tablet, Take 1 tablet (100 mg total) by mouth 3 (three) times a day as needed for bladder spasms, Disp: 10 tablet, Rfl: 0    simethicone (MYLICON,GAS-X) 868 MG CAPS, Take 1 capsule (125 mg total) by mouth every 6 Findings: No erythema or rash  Neurological:      Mental Status: She is alert and oriented to person, place, and time  Cranial Nerves: No cranial nerve deficit  Psychiatric:         Behavior: Behavior normal            Results & Discussion:     Patient's clinical exam is unchanged/ normal    All questions were answered the patient's satisfaction  We are scheduled for right JIMBO directed lumpectomy  Advance Care Planning/Advance Directives:  I discussed the disease status, treatment plans and follow-up with the patient

## 2021-07-19 NOTE — PRE-PROCEDURE INSTRUCTIONS
Pre-Surgery Instructions:   Medication Instructions    ascorbic acid (VITAMIN C) 1000 MG tablet Instructed patient per Anesthesia Guidelines  hold preop    omeprazole (PriLOSEC) 20 mg delayed release capsule Instructed patient per Anesthesia Guidelines  not taking    simethicone (MYLICON,GAS-X) 081 MG CAPS Instructed patient per Anesthesia Guidelines  prn    You will receive a phone call from hospital for arrival time  Please call surgeons office if any changes in your condition  Wear easy on/off clothing; consider type of surgery;  Valuables, jewelry, piercing's please keep at home  **COVID-19  education/surgical guidelines      Please: No contact lenses or eye make up, artificial eyelashes    Please secure transportation     Follow pre surgery showering or cleaning instructions as  Reviewed by nurse or surgeons office      Questions answered and concerns addressed

## 2021-07-20 ENCOUNTER — HOSPITAL ENCOUNTER (OUTPATIENT)
Dept: MAMMOGRAPHY | Facility: CLINIC | Age: 41
Discharge: HOME | End: 2021-07-20

## 2021-07-20 ENCOUNTER — HOSPITAL ENCOUNTER (OUTPATIENT)
Dept: ULTRASOUND IMAGING | Facility: CLINIC | Age: 41
Discharge: HOME | End: 2021-07-20
Payer: COMMERCIAL

## 2021-07-20 ENCOUNTER — HOSPITAL ENCOUNTER (OUTPATIENT)
Dept: MAMMOGRAPHY | Facility: HOSPITAL | Age: 41
Discharge: HOME | End: 2021-07-20

## 2021-07-20 VITALS — HEART RATE: 66 BPM | DIASTOLIC BLOOD PRESSURE: 61 MMHG | SYSTOLIC BLOOD PRESSURE: 92 MMHG

## 2021-07-20 DIAGNOSIS — R92.8 ABNORMAL MAMMOGRAM: ICD-10-CM

## 2021-07-20 DIAGNOSIS — N63.10 MASS OF RIGHT BREAST: ICD-10-CM

## 2021-07-20 PROCEDURE — A4648 IMPLANTABLE TISSUE MARKER: HCPCS

## 2021-07-20 PROCEDURE — 19285 PERQ DEV BREAST 1ST US IMAG: CPT

## 2021-07-20 RX ORDER — LIDOCAINE HYDROCHLORIDE 10 MG/ML
5 INJECTION, SOLUTION EPIDURAL; INFILTRATION; INTRACAUDAL; PERINEURAL ONCE
Status: COMPLETED | OUTPATIENT
Start: 2021-07-20 | End: 2021-07-20

## 2021-07-20 RX ADMIN — LIDOCAINE HYDROCHLORIDE 5 ML: 10 INJECTION, SOLUTION EPIDURAL; INFILTRATION; INTRACAUDAL; PERINEURAL at 14:33

## 2021-07-20 NOTE — PROGRESS NOTES
Procedure type:    __x___ultrasound guided _____stereotactic    Breast:    _____Left ___x__Right    Location: 1 o'clock 9 cmfn    Needle: N/A    # of passes: N/A    Clip: Dorina/  Reflector     Performed by: Dr Gala Marquez held for 5 minutes by: Asa Galarza Strips:    _____yes ___x__no    Band aid:    __x___yes_____no    Tape and guaze:    _____yes __x___no    Tolerated procedure:    ___x__yes _____no

## 2021-07-20 NOTE — DISCHARGE INSTR - OTHER ORDERS
POST LARGE CORE BREAST BIOPSY PATIENT INFORMATION      1  Place an ice pack inside your bra over the top of the dressing every hour for 20 minutes (20 minutes on, 60 minutes off)  Do this until bedtime  2  Do not shower or bathe until the following morning  3  You may bathe your breast carefully with the steri-strips in place  Be careful    Not to loosen them  The steri-strips will fall off in 3-5 days  4  You may have mild discomfort, and you may have some bruising where the   Needle entered the skin  This should clear within 5-7 days  5  If you need medicine for discomfort, take acetaminophen products such as   Tylenol  You may also take Advil or Motrin products  6  Do not participate in strenuous activities such as-tennis, aerobics, skiing,  Weight lifting, etc  for 24 hours  Refrain from swimming/soaking for 72 hours  7  Wearing a bra for sleeping may be more comfortable for the first 24-48 hours  8  Watch for continued bleeding, pain or fever over 101; please call with any questions or concerns  For procedures done at the [facility] ([address] call:  Nathanael Sierra RN at [facility] RN at [phone]                    *After 4 PM call the Interventional Radiology Department                    [phone] and ask to speak with the nurse on call  For procedures done at the [address] [place] [address] call:         Sia Tejeda RN at   *After 4 PM call the Interventional Radiology Department   [phone] and ask to speak with the nurse on call  For procedures done at [address] [place] [address] call: The Radiology Nurse at [phone]  *After 4 PM call your physician, or go to the Emergency Department  9          The final results of your biopsy are usually available within one week

## 2021-07-21 NOTE — PROGRESS NOTES
Post procedure call completed with patient  with patient near    Bleeding: _____yes __X___no (pt denies)    Pain: _____yes ___X___no (pt with some discomfort, used ice)    Redness/Swelling: ______yes ___X___no (pt denies)    Band aid removed: __X___yes _____no    Pt with no questions at this time, adv to call with any questions or concerns, has name/# for contact

## 2021-07-27 ENCOUNTER — HOSPITAL ENCOUNTER (OUTPATIENT)
Facility: HOSPITAL | Age: 41
Setting detail: OUTPATIENT SURGERY
Discharge: HOME | End: 2021-07-27
Attending: SURGERY | Admitting: SURGERY
Payer: COMMERCIAL

## 2021-07-27 ENCOUNTER — ANESTHESIA EVENT (OUTPATIENT)
Dept: PERIOP | Facility: HOSPITAL | Age: 41
End: 2021-07-27
Payer: COMMERCIAL

## 2021-07-27 ENCOUNTER — APPOINTMENT (OUTPATIENT)
Dept: MAMMOGRAPHY | Facility: HOSPITAL | Age: 41
End: 2021-07-27
Payer: COMMERCIAL

## 2021-07-27 ENCOUNTER — ANESTHESIA (OUTPATIENT)
Dept: PERIOP | Facility: HOSPITAL | Age: 41
End: 2021-07-27
Payer: COMMERCIAL

## 2021-07-27 VITALS
RESPIRATION RATE: 16 BRPM | WEIGHT: 158 LBS | BODY MASS INDEX: 21.43 KG/M2 | HEART RATE: 74 BPM | SYSTOLIC BLOOD PRESSURE: 111 MMHG | DIASTOLIC BLOOD PRESSURE: 67 MMHG | OXYGEN SATURATION: 99 % | TEMPERATURE: 97.5 F

## 2021-07-27 DIAGNOSIS — N63.10 MASS OF RIGHT BREAST: ICD-10-CM

## 2021-07-27 PROBLEM — C50.919 BREAST CANCER (HCC): Status: ACTIVE | Noted: 2021-07-27

## 2021-07-27 PROBLEM — K21.9 GERD (GASTROESOPHAGEAL REFLUX DISEASE): Status: ACTIVE | Noted: 2021-07-27

## 2021-07-27 LAB
EXT PREGNANCY TEST URINE: NEGATIVE
EXT. CONTROL: NORMAL

## 2021-07-27 PROCEDURE — 88307 TISSUE EXAM BY PATHOLOGIST: CPT | Performed by: PATHOLOGY

## 2021-07-27 PROCEDURE — 19301 PARTIAL MASTECTOMY: CPT | Performed by: SURGERY

## 2021-07-27 PROCEDURE — 81025 URINE PREGNANCY TEST: CPT | Performed by: SURGERY

## 2021-07-27 RX ORDER — FENTANYL CITRATE 50 UG/ML
INJECTION, SOLUTION INTRAMUSCULAR; INTRAVENOUS AS NEEDED
Status: DISCONTINUED | OUTPATIENT
Start: 2021-07-27 | End: 2021-07-27

## 2021-07-27 RX ORDER — ONDANSETRON 2 MG/ML
INJECTION INTRAMUSCULAR; INTRAVENOUS AS NEEDED
Status: DISCONTINUED | OUTPATIENT
Start: 2021-07-27 | End: 2021-07-27

## 2021-07-27 RX ORDER — MAGNESIUM HYDROXIDE 1200 MG/15ML
LIQUID ORAL AS NEEDED
Status: DISCONTINUED | OUTPATIENT
Start: 2021-07-27 | End: 2021-07-27 | Stop reason: HOSPADM

## 2021-07-27 RX ORDER — FENTANYL CITRATE/PF 50 MCG/ML
25 SYRINGE (ML) INJECTION
Status: DISCONTINUED | OUTPATIENT
Start: 2021-07-27 | End: 2021-07-27 | Stop reason: HOSPADM

## 2021-07-27 RX ORDER — METOCLOPRAMIDE HYDROCHLORIDE 5 MG/ML
10 INJECTION INTRAMUSCULAR; INTRAVENOUS ONCE AS NEEDED
Status: DISCONTINUED | OUTPATIENT
Start: 2021-07-27 | End: 2021-07-27 | Stop reason: HOSPADM

## 2021-07-27 RX ORDER — HYDROMORPHONE HCL/PF 1 MG/ML
0.5 SYRINGE (ML) INJECTION
Status: DISCONTINUED | OUTPATIENT
Start: 2021-07-27 | End: 2021-07-27 | Stop reason: HOSPADM

## 2021-07-27 RX ORDER — SODIUM CHLORIDE, SODIUM LACTATE, POTASSIUM CHLORIDE, CALCIUM CHLORIDE 600; 310; 30; 20 MG/100ML; MG/100ML; MG/100ML; MG/100ML
INJECTION, SOLUTION INTRAVENOUS CONTINUOUS PRN
Status: DISCONTINUED | OUTPATIENT
Start: 2021-07-27 | End: 2021-07-27

## 2021-07-27 RX ORDER — ONDANSETRON 2 MG/ML
4 INJECTION INTRAMUSCULAR; INTRAVENOUS ONCE AS NEEDED
Status: DISCONTINUED | OUTPATIENT
Start: 2021-07-27 | End: 2021-07-27 | Stop reason: HOSPADM

## 2021-07-27 RX ORDER — PROPOFOL 10 MG/ML
INJECTION, EMULSION INTRAVENOUS AS NEEDED
Status: DISCONTINUED | OUTPATIENT
Start: 2021-07-27 | End: 2021-07-27

## 2021-07-27 RX ORDER — BUPIVACAINE HYDROCHLORIDE AND EPINEPHRINE 2.5; 5 MG/ML; UG/ML
INJECTION, SOLUTION INFILTRATION; PERINEURAL AS NEEDED
Status: DISCONTINUED | OUTPATIENT
Start: 2021-07-27 | End: 2021-07-27 | Stop reason: HOSPADM

## 2021-07-27 RX ORDER — LIDOCAINE HYDROCHLORIDE 10 MG/ML
0.5 INJECTION, SOLUTION EPIDURAL; INFILTRATION; INTRACAUDAL; PERINEURAL ONCE AS NEEDED
Status: DISCONTINUED | OUTPATIENT
Start: 2021-07-27 | End: 2021-07-27 | Stop reason: HOSPADM

## 2021-07-27 RX ORDER — DEXAMETHASONE SODIUM PHOSPHATE 4 MG/ML
INJECTION, SOLUTION INTRA-ARTICULAR; INTRALESIONAL; INTRAMUSCULAR; INTRAVENOUS; SOFT TISSUE AS NEEDED
Status: DISCONTINUED | OUTPATIENT
Start: 2021-07-27 | End: 2021-07-27

## 2021-07-27 RX ORDER — CEFAZOLIN SODIUM 1 G/50ML
1000 SOLUTION INTRAVENOUS ONCE
Status: COMPLETED | OUTPATIENT
Start: 2021-07-27 | End: 2021-07-27

## 2021-07-27 RX ORDER — MIDAZOLAM HYDROCHLORIDE 2 MG/2ML
INJECTION, SOLUTION INTRAMUSCULAR; INTRAVENOUS AS NEEDED
Status: DISCONTINUED | OUTPATIENT
Start: 2021-07-27 | End: 2021-07-27

## 2021-07-27 RX ORDER — OXYCODONE HYDROCHLORIDE AND ACETAMINOPHEN 5; 325 MG/1; MG/1
1 TABLET ORAL EVERY 4 HOURS PRN
Status: DISCONTINUED | OUTPATIENT
Start: 2021-07-27 | End: 2021-07-27 | Stop reason: HOSPADM

## 2021-07-27 RX ORDER — EPHEDRINE SULFATE 50 MG/ML
INJECTION INTRAVENOUS AS NEEDED
Status: DISCONTINUED | OUTPATIENT
Start: 2021-07-27 | End: 2021-07-27

## 2021-07-27 RX ORDER — LIDOCAINE HYDROCHLORIDE 10 MG/ML
INJECTION, SOLUTION EPIDURAL; INFILTRATION; INTRACAUDAL; PERINEURAL AS NEEDED
Status: DISCONTINUED | OUTPATIENT
Start: 2021-07-27 | End: 2021-07-27

## 2021-07-27 RX ORDER — SODIUM CHLORIDE, SODIUM LACTATE, POTASSIUM CHLORIDE, CALCIUM CHLORIDE 600; 310; 30; 20 MG/100ML; MG/100ML; MG/100ML; MG/100ML
125 INJECTION, SOLUTION INTRAVENOUS CONTINUOUS
Status: DISCONTINUED | OUTPATIENT
Start: 2021-07-27 | End: 2021-07-27 | Stop reason: HOSPADM

## 2021-07-27 RX ADMIN — MIDAZOLAM HYDROCHLORIDE 2 MG: 1 INJECTION, SOLUTION INTRAMUSCULAR; INTRAVENOUS at 15:36

## 2021-07-27 RX ADMIN — EPHEDRINE SULFATE 5 MG: 50 INJECTION, SOLUTION INTRAVENOUS at 16:06

## 2021-07-27 RX ADMIN — EPHEDRINE SULFATE 5 MG: 50 INJECTION, SOLUTION INTRAVENOUS at 16:20

## 2021-07-27 RX ADMIN — DEXAMETHASONE SODIUM PHOSPHATE 8 MG: 4 INJECTION INTRA-ARTICULAR; INTRALESIONAL; INTRAMUSCULAR; INTRAVENOUS; SOFT TISSUE at 16:03

## 2021-07-27 RX ADMIN — ONDANSETRON 4 MG: 2 INJECTION INTRAMUSCULAR; INTRAVENOUS at 16:16

## 2021-07-27 RX ADMIN — LIDOCAINE HYDROCHLORIDE 50 MG: 10 INJECTION, SOLUTION EPIDURAL; INFILTRATION; INTRACAUDAL at 15:43

## 2021-07-27 RX ADMIN — FENTANYL CITRATE 25 MCG: 50 INJECTION, SOLUTION INTRAMUSCULAR; INTRAVENOUS at 16:05

## 2021-07-27 RX ADMIN — EPHEDRINE SULFATE 5 MG: 50 INJECTION, SOLUTION INTRAVENOUS at 16:11

## 2021-07-27 RX ADMIN — CEFAZOLIN SODIUM 2000 MG: 1 SOLUTION INTRAVENOUS at 15:36

## 2021-07-27 RX ADMIN — SODIUM CHLORIDE, SODIUM LACTATE, POTASSIUM CHLORIDE, AND CALCIUM CHLORIDE: .6; .31; .03; .02 INJECTION, SOLUTION INTRAVENOUS at 15:36

## 2021-07-27 RX ADMIN — FENTANYL CITRATE 25 MCG: 50 INJECTION, SOLUTION INTRAMUSCULAR; INTRAVENOUS at 16:17

## 2021-07-27 RX ADMIN — PROPOFOL 200 MG: 10 INJECTION, EMULSION INTRAVENOUS at 15:43

## 2021-07-27 RX ADMIN — OXYCODONE HYDROCHLORIDE AND ACETAMINOPHEN 1 TABLET: 5; 325 TABLET ORAL at 18:25

## 2021-07-27 NOTE — INTERVAL H&P NOTE
H&P reviewed  After examining the patient I find no changes in the patients condition since the H&P had been written      Vitals:    07/27/21 1518   BP: 100/55   Pulse: 69   Resp: 16   Temp: 98 °F (36 7 °C)   SpO2: 98%

## 2021-07-27 NOTE — ANESTHESIA PREPROCEDURE EVALUATION
Procedure:  BREAST JIMBO  DIRECTED LUMPECTOMY (Right Breast)    Relevant Problems   ANESTHESIA (within normal limits)   (-) History of anesthesia complications      CARDIO   (-) Chest pain   (-) GRULLON (dyspnea on exertion)      GI/HEPATIC   (+) GERD (gastroesophageal reflux disease) (well controlled)      /RENAL (within normal limits)      GYN   (+) Breast cancer (HCC)      MUSCULOSKELETAL   (+) Sciatica of left side      PULMONARY   (-) Shortness of breath   (-) URI (upper respiratory infection)        Physical Exam    Airway    Mallampati score: I  TM Distance: >3 FB  Neck ROM: full     Dental   No notable dental hx     Cardiovascular      Pulmonary      Other Findings        Anesthesia Plan  ASA Score- 2     Anesthesia Type- general with ASA Monitors  Additional Monitors:   Airway Plan: LMA  Plan Factors-Exercise tolerance (METS): >4 METS  Chart reviewed  Existing labs reviewed  Induction- intravenous  Postoperative Plan-     Informed Consent- Anesthetic plan and risks discussed with patient  I personally reviewed this patient with the CRNA  Discussed and agreed on the Anesthesia Plan with the CRNA  Cathy Cruz

## 2021-07-27 NOTE — ANESTHESIA POSTPROCEDURE EVALUATION
Post-Op Assessment Note    CV Status:  Stable    Pain management: adequate     Mental Status:  Awake and sleepy   Hydration Status:  Euvolemic   PONV Controlled:  Controlled   Airway Patency:  Patent      Post Op Vitals Reviewed: Yes      Staff: CRNA         No complications documented      BP   118/58   Temp   97 8   Pulse  69   Resp 18   SpO2 100

## 2021-07-27 NOTE — DISCHARGE INSTRUCTIONS
POST-OPERATIVE BREAST CARE INSTRUCTIONS    Wound Closure/Dressing: Your wound is closed with:   Steri strips-white pieces of tape that hold your incision together along with surgical glue           Dissolvable sutures-underneath the skin    Wound care:     If you have gauze over your wound you may remove it the day after surgery  Leave the Steri-strips on, they will fall off on their own in 1-2 weeks   You may shower using soap and water to clean your wound  Gently pat dry  Drain Care: If you do not have a drain, disregard this section   Visiting Nursing should have been arranged upon discharge from hospital   A nurse will call your home to schedule an initial visit  Please call the number below if you have not received a call within 48 hours of hospital discharge   You may shower with the LEW drains  Wash area around the drains with soap and water and pat dry   Record drain outputs on chart given to you at pre op visit and bring that chart to office at post op appt   LEW drains can be removed in the office by a nurse either at post op visit OR when drain output is 30 ccs or less in a 24 hour period for three days in a row   If you had plastic surgery or reconstructive surgery, the plastic surgeon will manage the drains  Other:       You can begin wearing your own bra when it feels comfortable   You may resume using deodorant the day after surgery                 Post-op visit:  your post-op visit is scheduled for:  2021 @ 8:00 AM    Call our office at [phone] if you have any  of the followin  Redness, swelling, heat, unusual drainage or heavy bleeding from wound  2  Fever greater than 101 °  3  Pain not relieved by medication

## 2021-07-27 NOTE — OP NOTE
OPERATIVE REPORT  PATIENT NAME: Jorje Limon    :  1980  MRN: [id number]  Pt Location: [facility] OR ROOM 03    SURGERY DATE: 2021    Surgeon(s) and Role:     * Mina Westfall MD - Primary     * Milka Baker MD - Assisting    Preop Diagnosis:  Mass of right breast [N63 10]    Post-Op Diagnosis Codes:     * Mass of right breast [N63 10]    Procedure(s) (LRB):  BREAST JIMBO  DIRECTED LUMPECTOMY (Right)    Specimen(s):  ID Type Source Tests Collected by Time Destination   1 : RIGHT BREAST LUMPECTOMY, MARKED PER PROTOCOL Tissue Breast, Right TISSUE EXAM Mina Westfall MD 2021 1609        Estimated Blood Loss:   Minimal    Drains:  * No LDAs found *    Anesthesia Type:   General    Operative Indications: Mass of right breast [N63 10]      Operative Findings:  Good specimen radiograph    Complications:   None    Procedure and Technique:  I previously reviewed the post biopsy images  Lumpectomy  The patient was brought to the operation room and placed under general anesthesia  Attention was paid to ensure appropriate padding and correct positioning  The JIMBO  detector was then used to locate the position of the JIMBO deflector and the skin was marked in this area  The right breast was prepped and draped in a sterile fashion  I initiated a time-out, identifying the patient, the correct side and the above procedure  All parties agreed with the time out  The planned incision was then injected with 0 25% Marcaine for local anesthesia  The incision was sharply incised  The JIMBO dectector was used to guide the dissection  Superior, inferior, medial and lateral planes were developed around the JIMBO deflector and the specimen truncated with electrocautery beyond the deflector  The specimen was then inked for orientation purposes  A specimen radiograph was taken in two dimensions  The clips were in the center of the specimen    The specimen was sent for permanent pathologic analysis  Superficial bleeding controlled with electrocautery and the wound was extensively irrigated  The wound was closed with two layers, an inner layer of 3-0 vicryl and a subcuticular layer of 4-0 monocryl  Steri-strips were applied  The counts were correct x 2     I was present for the entire procedure    Patient Disposition:  PACU     SIGNATURE: Mina Westfall MD  DATE: July 27, 2021  TIME: 4:19 PM

## 2021-07-28 ENCOUNTER — APPOINTMENT (OUTPATIENT)
Dept: MAMMOGRAPHY | Facility: HOSPITAL | Age: 41
End: 2021-07-28
Payer: COMMERCIAL

## 2021-08-03 PROBLEM — C50.919 BREAST CANCER (HCC): Status: RESOLVED | Noted: 2021-07-27 | Resolved: 2021-08-03

## 2021-08-05 ENCOUNTER — OFFICE VISIT (OUTPATIENT)
Dept: SURGICAL ONCOLOGY | Facility: CLINIC | Age: 41
End: 2021-08-05

## 2021-08-05 VITALS
HEIGHT: 69 IN | RESPIRATION RATE: 14 BRPM | HEART RATE: 64 BPM | DIASTOLIC BLOOD PRESSURE: 76 MMHG | SYSTOLIC BLOOD PRESSURE: 102 MMHG | BODY MASS INDEX: 23.7 KG/M2 | OXYGEN SATURATION: 99 % | WEIGHT: 160 LBS | TEMPERATURE: 98 F

## 2021-08-05 DIAGNOSIS — D24.1 FIBROADENOMA OF RIGHT BREAST: ICD-10-CM

## 2021-08-05 DIAGNOSIS — R92.8 ABNORMAL FINDING ON BREAST IMAGING: ICD-10-CM

## 2021-08-05 DIAGNOSIS — Z98.890 STATUS POST RIGHT BREAST LUMPECTOMY: Primary | ICD-10-CM

## 2021-08-05 PROCEDURE — 99024 POSTOP FOLLOW-UP VISIT: CPT | Performed by: SURGERY

## 2021-08-05 PROCEDURE — 3008F BODY MASS INDEX DOCD: CPT | Performed by: SURGERY

## 2021-08-05 NOTE — PROGRESS NOTES
Surgical Oncology Breast Post-Op       [facility]  [facility] [place]  [address] [facility] [place] [address]    Altagracia Rodriguez  1980  [id number]  [address]  [facility] [place]  [address] [address]    Chief Complaint:   Harris Pérez is seen for a post-operative visit of her recent breast surgery  Oncology History:     Oncology History    No history exists  Assessment & Plan:   Assessment/Plan    Patient presents for a postoperative visit  Her pathology demonstrated she had a right fibroadenoma completely excised  She is due for follow-up mammograms and ultrasounds for additional benign-appearing lesions we will coordinate those visits in appointments for her  Presuming that is normal she will follow up with her family physician  History of Present Illness:   See Oncology History    Interval History:   See Assessment & Plan  Patient complains of some right breast pain at the incision  It is well controlled by Tylenol  Review of Systems:   Review of Systems   All other systems reviewed and are negative        Past Medical History:     Patient Active Problem List   Diagnosis    Non-ulcer dyspepsia    Sciatica of left side    Menorrhagia with regular cycle    Intramural and subserous leiomyoma of uterus    Subserous leiomyoma of uterus    Frequency of urination    GERD (gastroesophageal reflux disease)     Past Medical History:   Diagnosis Date    GERD (gastroesophageal reflux disease)     Sciatica of left side      Past Surgical History:   Procedure Laterality Date    BREAST LUMPECTOMY Right 2021    Procedure: BREAST JIMBO  DIRECTED LUMPECTOMY;  Surgeon: Bernardo Smith MD;  Location: [facility];  Service: Surgical Oncology     SECTION      EGD      US BREAST JIMBO  NEEDLE LOC RIGHT Right 2021    US GUIDED BREAST BIOPSY RIGHT COMPLETE Right 6/1/2021     Family History   Problem Relation Age of Onset    Heart attack Father     Heart disease Father     No Known Problems Sister      Social History     Socioeconomic History    Marital status: /Civil Union     Spouse name: Not on file    Number of children: Not on file    Years of education: Not on file    Highest education level: Not on file   Occupational History    Not on file   Tobacco Use    Smoking status: Never Smoker    Smokeless tobacco: Never Used   Vaping Use    Vaping Use: Never used   Substance and Sexual Activity    Alcohol use: No    Drug use: Never    Sexual activity: Yes     Partners: Male   Other Topics Concern    Not on file   Social History Narrative    Not on file     Social Determinants of Health     Financial Resource Strain:     Difficulty of Paying Living Expenses:    Food Insecurity:     Worried About Running Out of Food in the Last Year:     Ran Out of Food in the Last Year:    Transportation Needs:     Lack of Transportation (Medical):  Lack of Transportation (Non-Medical):    Physical Activity: Inactive    Days of Exercise per Week: 0 days    Minutes of Exercise per Session: 0 min   Stress: Stress Concern Present    Feeling of Stress :  To some extent   Social Connections:     Frequency of Communication with Friends and Family:     Frequency of Social Gatherings with Friends and Family:     Attends Restoration Services:     Active Member of Clubs or Organizations:     Attends Club or Organization Meetings:     Marital Status:    Intimate Partner Violence:     Fear of Current or Ex-Partner:     Emotionally Abused:     Physically Abused:     Sexually Abused:        Current Outpatient Medications:     ascorbic acid (VITAMIN C) 1000 MG tablet, Take by mouth as needed , Disp: , Rfl:     DOCOSAHEXAENOIC ACID PO, Take by mouth , Disp: , Rfl:     ibuprofen (MOTRIN) 200 mg tablet, Take by mouth every 6 (six) hours as needed for mild pain, Disp: , Rfl:     oxyCODONE-acetaminophen (PERCOCET) 5-325 mg per tablet, Take 1 tablet by mouth every 6 (six) hours as needed for moderate painMax Daily Amount: 4 tablets, Disp: 6 tablet, Rfl: 0    phenazopyridine (PYRIDIUM) 100 mg tablet, Take 1 tablet (100 mg total) by mouth 3 (three) times a day as needed for bladder spasms, Disp: 10 tablet, Rfl: 0    simethicone (MYLICON,GAS-X) 958 MG CAPS, Take 1 capsule (125 mg total) by mouth every 6 (six) hours as needed for flatulence, Disp: 28 each, Rfl: 0    omeprazole (PriLOSEC) 20 mg delayed release capsule, Take 1 capsule by mouth daily as needed  (Patient not taking: Reported on 7/19/2021), Disp: , Rfl:   No Known Allergies    Physical Exams:     Vitals:    08/05/21 0803   BP: 102/76   Pulse: 64   Resp: 14   Temp: 98 °F (36 7 °C)   SpO2: 99%     Physical Exam  Chest:          Comments:   Examination of the right incision demonstrates no evidence of infection  The medial 4 mm of the wound is slightly open of recommended triple antibiotic ointment for this which should heal this some time  The remainder of her Steri-Strips were removed without difficulty  Results & Discussion:     Overall the patient is healing relatively well with the exception of the medial aspect of her incision which should heal over the next 1-2 weeks  I have recommended a scar cream   I reviewed her pathology with her and provided her a copy of the report  I explained that the fibroadenoma is benign and needs no further follow-up however she did need follow-up for additional benign appearing  Findings on her previous mammogram will coordinate those studies for her  All questions were answered the patient's satisfaction  We will see her back on as-needed basis

## 2021-08-20 ENCOUNTER — TELEPHONE (OUTPATIENT)
Dept: SURGICAL ONCOLOGY | Facility: CLINIC | Age: 41
End: 2021-08-20

## 2021-08-20 NOTE — TELEPHONE ENCOUNTER
Yana Jackson is experiencing some concerns with her right breast, please call her  Rochelle [phone]

## 2021-08-20 NOTE — TELEPHONE ENCOUNTER
Returned the phone call  Patient complains of pain in her surgical breast and firmness around the site that has been there for one week  Denies any fevers, redness, or heat at the area  Appointment scheduled for patient to see Dr Wilfrido Curtis on Monday, 8/23 at 3:40  Patient's  verbalized understanding of appointment details

## 2021-08-23 ENCOUNTER — OFFICE VISIT (OUTPATIENT)
Dept: SURGICAL ONCOLOGY | Facility: CLINIC | Age: 41
End: 2021-08-23
Payer: COMMERCIAL

## 2021-08-23 VITALS
HEIGHT: 69 IN | OXYGEN SATURATION: 98 % | RESPIRATION RATE: 12 BRPM | SYSTOLIC BLOOD PRESSURE: 112 MMHG | BODY MASS INDEX: 24.08 KG/M2 | TEMPERATURE: 97.5 F | DIASTOLIC BLOOD PRESSURE: 68 MMHG | HEART RATE: 81 BPM | WEIGHT: 162.6 LBS

## 2021-08-23 DIAGNOSIS — D24.1 FIBROADENOMA OF BREAST, RIGHT: ICD-10-CM

## 2021-08-23 DIAGNOSIS — N64.4 PAIN OF RIGHT BREAST: Primary | ICD-10-CM

## 2021-08-23 PROCEDURE — 99024 POSTOP FOLLOW-UP VISIT: CPT | Performed by: SURGERY

## 2021-08-23 NOTE — PROGRESS NOTES
Surgical Oncology Follow Up  [facility] [address] [facility] [address] [address]  [place] [id number]  [address] [place] [address]  1980  [id number]      Chief Complaint   Patient presents with    Follow-up     pain in breast after surgery        Assessment & Plan:   She is here for follow-up after right breast lumpectomy for fibroadenoma with Dr Matias Weaver  No surgical site infection  Discomfort from scarring  Her pain is a more of like due to the scarring and the location  There is no collection  No skin changes other than scarring    Cancer History:     Oncology History    No history exists  Interval History:   Follow-up for surgical site right breast discomfort  Review of Systems:   Review of Systems   Constitutional: Negative for chills and fever  HENT: Negative for ear pain and sore throat  Eyes: Negative for pain and visual disturbance  Respiratory: Negative for cough and shortness of breath  Cardiovascular: Negative for chest pain and palpitations  Gastrointestinal: Negative for abdominal pain and vomiting  Genitourinary: Negative for dysuria and hematuria  Musculoskeletal: Negative for arthralgias and back pain  Skin: Negative for color change and rash  Neurological: Negative for seizures and syncope  All other systems reviewed and are negative        Past Medical History     Patient Active Problem List   Diagnosis    Non-ulcer dyspepsia    Sciatica of left side    Menorrhagia with regular cycle    Intramural and subserous leiomyoma of uterus    Subserous leiomyoma of uterus    Frequency of urination    GERD (gastroesophageal reflux disease)     Past Medical History:   Diagnosis Date    GERD (gastroesophageal reflux disease)     Sciatica of left side      Past Surgical History:   Procedure Laterality Date    BREAST LUMPECTOMY Right 7/27/2021    Procedure: BREAST JIMBO  DIRECTED LUMPECTOMY;  Surgeon: Patti Palacios MD;  [facility];  Service: Surgical Oncology     SECTION      EGD      US BREAST JIMBO  NEEDLE LOC RIGHT Right 2021    US GUIDED BREAST BIOPSY RIGHT COMPLETE Right 2021     Family History   Problem Relation Age of Onset    Heart attack Father     Heart disease Father     No Known Problems Sister      Social History     Socioeconomic History    Marital status: /Civil Union     Spouse name: Not on file    Number of children: Not on file    Years of education: Not on file    Highest education level: Not on file   Occupational History    Not on file   Tobacco Use    Smoking status: Never Smoker    Smokeless tobacco: Never Used   Vaping Use    Vaping Use: Never used   Substance and Sexual Activity    Alcohol use: No    Drug use: Never    Sexual activity: Yes     Partners: Male   Other Topics Concern    Not on file   Social History Narrative    Not on file     Social Determinants of Health     Financial Resource Strain:     Difficulty of Paying Living Expenses:    Food Insecurity:     Worried About Running Out of Food in the Last Year:     Ran Out of Food in the Last Year:    Transportation Needs:     Lack of Transportation (Medical):  Lack of Transportation (Non-Medical):    Physical Activity: Inactive    Days of Exercise per Week: 0 days    Minutes of Exercise per Session: 0 min   Stress: Stress Concern Present    Feeling of Stress :  To some extent   Social Connections:     Frequency of Communication with Friends and Family:     Frequency of Social Gatherings with Friends and Family:     Attends [employer] Services:     Active Member of Clubs or Organizations:     Attends Club or Organization Meetings:     Marital Status:    Intimate Partner Violence:     Fear of Current or Ex-Partner:     Emotionally Abused:     Physically Abused:     Sexually Abused:        Current Outpatient Medications:     ibuprofen (MOTRIN) 200 mg tablet, Take by mouth every 6 (six) hours as needed for mild pain, Disp: , Rfl:     ascorbic acid (VITAMIN C) 1000 MG tablet, Take by mouth as needed  (Patient not taking: Reported on 8/23/2021), Disp: , Rfl:     DOCOSAHEXAENOIC ACID PO, Take by mouth  (Patient not taking: Reported on 8/23/2021), Disp: , Rfl:     omeprazole (PriLOSEC) 20 mg delayed release capsule, Take 1 capsule by mouth daily as needed  (Patient not taking: Reported on 7/19/2021), Disp: , Rfl:     oxyCODONE-acetaminophen (PERCOCET) 5-325 mg per tablet, Take 1 tablet by mouth every 6 (six) hours as needed for moderate painMax Daily Amount: 4 tablets (Patient not taking: Reported on 8/23/2021), Disp: 6 tablet, Rfl: 0    phenazopyridine (PYRIDIUM) 100 mg tablet, Take 1 tablet (100 mg total) by mouth 3 (three) times a day as needed for bladder spasms (Patient not taking: Reported on 8/23/2021), Disp: 10 tablet, Rfl: 0    simethicone (MYLICON,GAS-X) 591 MG CAPS, Take 1 capsule (125 mg total) by mouth every 6 (six) hours as needed for flatulence (Patient not taking: Reported on 8/23/2021), Disp: 28 each, Rfl: 0  No Known Allergies    Physical Exam:     Vitals:    08/23/21 1232   BP: 112/68   Pulse: 81   Resp: 12   Temp: 97 5 °F (36 4 °C)   SpO2: 98%     Physical Exam  Constitutional:       Appearance: Normal appearance  HENT:      Head: Normocephalic and atraumatic  Nose: Nose normal       Mouth/Throat:      Mouth: Mucous membranes are moist    Eyes:      Pupils: Pupils are equal, round, and reactive to light  Cardiovascular:      Rate and Rhythm: Normal rate  Pulses: Normal pulses  Heart sounds: Normal heart sounds  Pulmonary:      Effort: Pulmonary effort is normal       Breath sounds: Normal breath sounds  Chest:          Comments: The bilateral Breast(s) were examined  There was not any sign of an inverted nipple, mass, nipple discharge, skin changes or tenderness   The bilateral  breast(s) were examined in the sitting and supine position  There are not any worrisome skin changes, tenderness, nipple changes, swelling ,bleeding or evidence of mass/s in all four quadrants  Marcia survey demonstrated that there is not any evidence of any clinically suspicious axillary, pectoral or supraclavicular lymph nodes  Abdominal:      General: Bowel sounds are normal       Palpations: Abdomen is soft  Musculoskeletal:         General: Normal range of motion  Cervical back: Normal range of motion and neck supple  Skin:     General: Skin is warm  Neurological:      General: No focal deficit present  Mental Status: She is alert and oriented to person, place, and time  Psychiatric:         Mood and Affect: Mood normal          Behavior: Behavior normal          Thought Content: Thought content normal          Judgment: Judgment normal            Results & Discussion:     All patient questions were answered  I explained to her this is most likely scarring from her recent surgery and the expected to have some discomfort  I have told her we call us with any questions and or concerns otherwise will follow-up as needed basis  She and  understand and agree to do so  Advance Care Planning/Advance Directives: Memo Alvarez MD discussed the disease status with Hansa franz 08/23/21  treatment plans and follow-up with the patient

## 2021-09-20 ENCOUNTER — HOSPITAL ENCOUNTER (EMERGENCY)
Facility: HOSPITAL | Age: 41
Discharge: HOME | End: 2021-09-20
Attending: EMERGENCY MEDICINE | Admitting: EMERGENCY MEDICINE
Payer: COMMERCIAL

## 2021-09-20 VITALS
TEMPERATURE: 97.7 F | BODY MASS INDEX: 24.1 KG/M2 | DIASTOLIC BLOOD PRESSURE: 63 MMHG | OXYGEN SATURATION: 96 % | WEIGHT: 162.7 LBS | RESPIRATION RATE: 20 BRPM | HEIGHT: 69 IN | SYSTOLIC BLOOD PRESSURE: 127 MMHG | HEART RATE: 77 BPM

## 2021-09-20 DIAGNOSIS — S16.1XXA ACUTE STRAIN OF NECK MUSCLE, INITIAL ENCOUNTER: ICD-10-CM

## 2021-09-20 DIAGNOSIS — M62.838 TRAPEZIUS MUSCLE SPASM: Primary | ICD-10-CM

## 2021-09-20 PROCEDURE — 99283 EMERGENCY DEPT VISIT LOW MDM: CPT

## 2021-09-20 PROCEDURE — 99284 EMERGENCY DEPT VISIT MOD MDM: CPT | Performed by: PHYSICIAN ASSISTANT

## 2021-09-20 RX ORDER — HYDROCODONE BITARTRATE AND ACETAMINOPHEN 5; 325 MG/1; MG/1
1 TABLET ORAL EVERY 6 HOURS PRN
Qty: 15 TABLET | Refills: 0 | Status: SHIPPED | OUTPATIENT
Start: 2021-09-20 | End: 2021-12-03

## 2021-09-20 RX ORDER — METHOCARBAMOL 750 MG/1
750 TABLET, FILM COATED ORAL EVERY 6 HOURS PRN
Qty: 20 TABLET | Refills: 0 | Status: SHIPPED | OUTPATIENT
Start: 2021-09-20 | End: 2021-12-03

## 2021-12-03 ENCOUNTER — OFFICE VISIT (OUTPATIENT)
Dept: INTERNAL MEDICINE CLINIC | Facility: CLINIC | Age: 41
End: 2021-12-03
Payer: COMMERCIAL

## 2021-12-03 VITALS
RESPIRATION RATE: 16 BRPM | OXYGEN SATURATION: 99 % | WEIGHT: 162.2 LBS | BODY MASS INDEX: 24.02 KG/M2 | DIASTOLIC BLOOD PRESSURE: 74 MMHG | TEMPERATURE: 97.6 F | HEIGHT: 69 IN | SYSTOLIC BLOOD PRESSURE: 104 MMHG | HEART RATE: 72 BPM

## 2021-12-03 DIAGNOSIS — R42 VERTIGO: Primary | ICD-10-CM

## 2021-12-03 DIAGNOSIS — R14.3 FLATULENCE: ICD-10-CM

## 2021-12-03 PROCEDURE — 99213 OFFICE O/P EST LOW 20 MIN: CPT | Performed by: NURSE PRACTITIONER

## 2021-12-03 RX ORDER — MECLIZINE HYDROCHLORIDE 25 MG/1
25 TABLET ORAL 3 TIMES DAILY PRN
Qty: 30 TABLET | Refills: 0 | Status: SHIPPED | OUTPATIENT
Start: 2021-12-03

## 2021-12-03 RX ORDER — ONDANSETRON 4 MG/1
4 TABLET, ORALLY DISINTEGRATING ORAL EVERY 6 HOURS PRN
Qty: 20 TABLET | Refills: 0 | Status: SHIPPED | OUTPATIENT
Start: 2021-12-03

## 2021-12-03 RX ORDER — SIMETHICONE 125 MG
125 CAPSULE ORAL EVERY 6 HOURS PRN
Qty: 60 CAPSULE | Refills: 0 | Status: SHIPPED | OUTPATIENT
Start: 2021-12-03

## 2021-12-06 ENCOUNTER — TELEPHONE (OUTPATIENT)
Dept: INTERNAL MEDICINE CLINIC | Facility: CLINIC | Age: 41
End: 2021-12-06

## 2021-12-06 NOTE — TELEPHONE ENCOUNTER
Kacie can't void for the past 2 days  She's able to urinate    Is it because of being on: meclizine, 25 mg

## 2021-12-06 NOTE — TELEPHONE ENCOUNTER
Yes a potential side effect stop the meclizine but if she does not urinate by 5:00 p m  go to the emergency room

## 2021-12-22 ENCOUNTER — VBI (OUTPATIENT)
Dept: ADMINISTRATIVE | Facility: OTHER | Age: 41
End: 2021-12-22

## 2022-02-04 ENCOUNTER — HOSPITAL ENCOUNTER (OUTPATIENT)
Dept: ULTRASOUND IMAGING | Facility: CLINIC | Age: 42
Discharge: HOME | End: 2022-02-04
Payer: COMMERCIAL

## 2022-02-04 ENCOUNTER — HOSPITAL ENCOUNTER (OUTPATIENT)
Dept: MAMMOGRAPHY | Facility: CLINIC | Age: 42
Discharge: HOME | End: 2022-02-04
Payer: COMMERCIAL

## 2022-02-04 VITALS — HEIGHT: 69 IN | BODY MASS INDEX: 23.99 KG/M2 | WEIGHT: 162 LBS

## 2022-02-04 DIAGNOSIS — R92.8 ABNORMAL FINDING ON BREAST IMAGING: ICD-10-CM

## 2022-02-04 DIAGNOSIS — R92.8 ABNORMAL MAMMOGRAM: ICD-10-CM

## 2022-02-04 PROCEDURE — 76642 ULTRASOUND BREAST LIMITED: CPT

## 2022-02-04 PROCEDURE — 77066 DX MAMMO INCL CAD BI: CPT

## 2022-02-04 PROCEDURE — G0279 TOMOSYNTHESIS, MAMMO: HCPCS

## 2022-04-29 ENCOUNTER — IMMUNIZATIONS (OUTPATIENT)
Dept: FAMILY MEDICINE CLINIC | Facility: HOSPITAL | Age: 42
End: 2022-04-29

## 2022-04-29 PROCEDURE — 0054A COVID-19 PFIZER VACC TRIS-SUCROSE GRAY CAP 0.3 ML: CPT

## 2022-04-29 PROCEDURE — 91305 COVID-19 PFIZER VACC TRIS-SUCROSE GRAY CAP 0.3 ML: CPT

## 2022-07-12 ENCOUNTER — VBI (OUTPATIENT)
Dept: ADMINISTRATIVE | Facility: OTHER | Age: 42
End: 2022-07-12

## 2022-08-10 ENCOUNTER — HOSPITAL ENCOUNTER (OUTPATIENT)
Dept: ULTRASOUND IMAGING | Facility: CLINIC | Age: 42
Discharge: HOME | End: 2022-08-10
Payer: COMMERCIAL

## 2022-08-10 DIAGNOSIS — R92.8 ABNORMAL MAMMOGRAM: ICD-10-CM

## 2022-08-10 PROCEDURE — 76642 ULTRASOUND BREAST LIMITED: CPT

## 2022-12-01 ENCOUNTER — HOSPITAL ENCOUNTER (EMERGENCY)
Facility: HOSPITAL | Age: 42
Discharge: HOME | End: 2022-12-01
Attending: EMERGENCY MEDICINE

## 2022-12-01 ENCOUNTER — APPOINTMENT (EMERGENCY)
Dept: RADIOLOGY | Facility: HOSPITAL | Age: 42
End: 2022-12-01

## 2022-12-01 VITALS
DIASTOLIC BLOOD PRESSURE: 53 MMHG | OXYGEN SATURATION: 99 % | HEART RATE: 67 BPM | RESPIRATION RATE: 18 BRPM | TEMPERATURE: 97 F | SYSTOLIC BLOOD PRESSURE: 107 MMHG

## 2022-12-01 DIAGNOSIS — M62.838 NECK MUSCLE SPASM: Primary | ICD-10-CM

## 2022-12-01 RX ORDER — NAPROXEN 250 MG/1
500 TABLET ORAL ONCE
Status: COMPLETED | OUTPATIENT
Start: 2022-12-01 | End: 2022-12-01

## 2022-12-01 RX ORDER — METHOCARBAMOL 500 MG/1
500 TABLET, FILM COATED ORAL 2 TIMES DAILY PRN
Qty: 20 TABLET | Refills: 0 | Status: SHIPPED | OUTPATIENT
Start: 2022-12-01

## 2022-12-01 RX ORDER — NAPROXEN 500 MG/1
500 TABLET ORAL 2 TIMES DAILY PRN
Qty: 30 TABLET | Refills: 0 | Status: SHIPPED | OUTPATIENT
Start: 2022-12-01

## 2022-12-01 RX ORDER — METHOCARBAMOL 500 MG/1
500 TABLET, FILM COATED ORAL ONCE
Status: COMPLETED | OUTPATIENT
Start: 2022-12-01 | End: 2022-12-01

## 2022-12-01 RX ADMIN — NAPROXEN 500 MG: 250 TABLET ORAL at 18:02

## 2022-12-01 RX ADMIN — METHOCARBAMOL 500 MG: 500 TABLET ORAL at 18:01

## 2022-12-01 NOTE — ED PROVIDER NOTES
History  Chief Complaint   Patient presents with   • Neck Pain     Pt c/o ongoing b/l back & neck pain x 3 weeks; pt notes some relief with OTC meds     39 y/o female presents to the ER for neck pain and shoulder pain  Patient stated that she has been having neck pain that radiates into b/l shoulders for the last 3 weeks  Patient stated that she went to sleep and woke up with the neck pain; no injury  Patient stated that the pain has been increasing over the last couple of weeks despite taking OTC medications  Pain is worse with range of motion  Sharp, shooting pain from her neck into collateral shoulder when turning her neck  Patient also reports that there is pain that radiates into her left ear  Patient is able to open mouth without difficulty  She has tired using heat, which does help  Denies tingling, numbness, weakness, or visual change  History provided by:  Patient  Neck Pain  Pain location:  L side and R side  Quality:  Shooting and stabbing  Pain radiates to:  L shoulder and R shoulder  Pain severity:  Moderate  Pain is:  Same all the time  Onset quality:  Sudden  Duration:  3 weeks  Timing:  Constant  Progression:  Worsening  Chronicity:  New  Context: not fall, not MVC and not recent injury    Relieved by:  Nothing  Worsened by:  Position and twisting  Ineffective treatments:  Analgesics  Associated symptoms: no bladder incontinence, no bowel incontinence, no fever, no numbness, no tingling, no visual change and no weakness        Prior to Admission Medications   Prescriptions Last Dose Informant Patient Reported? Taking?    DOCOSAHEXAENOIC ACID PO   Yes No   Sig: Take by mouth    Patient not taking: Reported on 8/23/2021   ascorbic acid (VITAMIN C) 1000 MG tablet   Yes No   Sig: Take by mouth as needed    Patient not taking: Reported on 8/23/2021   ibuprofen (MOTRIN) 100 mg/5 mL suspension   Yes No   Sig: Take by mouth as needed for mild pain   meclizine (ANTIVERT) 25 mg tablet   No No   Sig: Take 1 tablet (25 mg total) by mouth 3 (three) times a day as needed for dizziness   ondansetron (Zofran ODT) 4 mg disintegrating tablet   No No   Sig: Take 1 tablet (4 mg total) by mouth every 6 (six) hours as needed for nausea or vomiting   simethicone (MYLICON,GAS-X) 067 MG CAPS   No No   Sig: Take 1 capsule (125 mg total) by mouth every 6 (six) hours as needed for flatulence      Facility-Administered Medications: None       Past Medical History:   Diagnosis Date   • GERD (gastroesophageal reflux disease)    • Sciatica of left side        Past Surgical History:   Procedure Laterality Date   • BREAST LUMPECTOMY Right 2021    Procedure: BREAST JIMBO  DIRECTED LUMPECTOMY;  Surgeon: Kelton Thomason MD;  [facility];  Service: Surgical Oncology   •  SECTION     • EGD     • US BREAST JIMBO  NEEDLE LOC RIGHT Right 2021   • US GUIDED BREAST BIOPSY RIGHT COMPLETE Right 2021       Family History   Problem Relation Age of Onset   • Heart attack Father    • Heart disease Father    • No Known Problems Mother    • No Known Problems Son    • No Known Problems Sister    • No Known Problems Maternal Grandmother    • No Known Problems Maternal Grandfather    • No Known Problems Paternal Grandmother    • No Known Problems Paternal Grandfather    • Breast cancer Maternal Aunt 80   • BRCA2 Positive Neg Hx    • BRCA1 Positive Neg Hx    • BRCA2 Negative Neg Hx    • BRCA1 Negative Neg Hx    • BRCA 1/2 Neg Hx    • Ovarian cancer Neg Hx    • Endometrial cancer Neg Hx    • Colon cancer Neg Hx    • Breast cancer additional onset Neg Hx      I have reviewed and agree with the history as documented      E-Cigarette/Vaping   • E-Cigarette Use Never User      E-Cigarette/Vaping Substances   • Nicotine No    • THC No    • CBD No    • Flavoring No    • Other No    • Unknown No      Social History     Tobacco Use   • Smoking status: Never   • Smokeless tobacco: Never   Vaping Use   • Vaping Use: Never used Substance Use Topics   • Alcohol use: No   • Drug use: Never       Review of Systems   Constitutional: Negative for chills and fever  Eyes: Negative for visual disturbance  Gastrointestinal: Negative for bowel incontinence and vomiting  Genitourinary: Negative for bladder incontinence and urgency  Musculoskeletal: Positive for myalgias (b/l shoulder pain), neck pain (b/l sides of neck) and neck stiffness  Negative for arthralgias and back pain  Skin: Negative for color change and rash  Neurological: Negative for tingling, syncope, weakness and numbness  Psychiatric/Behavioral: Positive for sleep disturbance  All other systems reviewed and are negative  Physical Exam  Physical Exam  Vitals and nursing note reviewed  Constitutional:       General: She is not in acute distress  Appearance: She is well-developed  HENT:      Head: Normocephalic and atraumatic  Right Ear: External ear normal       Left Ear: External ear normal       Nose: Nose normal    Eyes:      Conjunctiva/sclera: Conjunctivae normal    Cardiovascular:      Rate and Rhythm: Normal rate and regular rhythm  Pulses: Normal pulses  Heart sounds: Normal heart sounds  No murmur heard  Pulmonary:      Effort: Pulmonary effort is normal  No respiratory distress  Breath sounds: Normal breath sounds  Musculoskeletal:         General: No swelling  Right shoulder: Tenderness present  Normal strength  Normal pulse  Left shoulder: Tenderness present  Normal strength  Normal pulse  Cervical back: Tenderness present  Back:    Lymphadenopathy:      Cervical: No cervical adenopathy  Skin:     General: Skin is warm and dry  Capillary Refill: Capillary refill takes less than 2 seconds  Neurological:      Mental Status: She is alert and oriented to person, place, and time     Psychiatric:         Mood and Affect: Mood normal          Vital Signs  ED Triage Vitals   Temperature Pulse Respirations Blood Pressure SpO2   12/01/22 1605 12/01/22 1605 12/01/22 1605 12/01/22 1605 12/01/22 1605   (!) 97 °F (36 1 °C) 67 18 107/53 99 %      Temp Source Heart Rate Source Patient Position - Orthostatic VS BP Location FiO2 (%)   12/01/22 1605 12/01/22 1605 12/01/22 1605 12/01/22 1605 --   Temporal Monitor Sitting Left arm       Pain Score       12/01/22 1802       6           Vitals:    12/01/22 1605   BP: 107/53   Pulse: 67   Patient Position - Orthostatic VS: Sitting         Visual Acuity      ED Medications  Medications   methocarbamol (ROBAXIN) tablet 500 mg (500 mg Oral Given 12/1/22 1801)   naproxen (NAPROSYN) tablet 500 mg (500 mg Oral Given 12/1/22 1802)       Diagnostic Studies  Results Reviewed     None                 XR cervical spine 2 or 3 views    (Results Pending)              Procedures  Procedures         ED Course                                             MDM  Number of Diagnoses or Management Options  Neck muscle spasm: new and requires workup  Diagnosis management comments: 41 y/o female presents to the ER for neck pain and b/l shoulder pain  This patient presents with neck pain most consistent with musculoskeletal spasm  No back pain red flags on history or physical exam  Presentation not consistent with malignancy, fracture, transverse myelitis, osteomyelitis, or retropharyngeal abscess  Cervical xray unremarkable  Patient received a dose of naproxen and robaxin and had sufficient reduction of pain  Prescriptions given for naproxen 500mg BID prn and Robaxin 500mg BID prn  Add heating pad  I had a detailed discussion regarding the historical points, exam findings, and diagnostic results supporting the discharge diagnosis  I also discussed the need for outpatient follow-up with comprehensive spine and the need to return to the ED if symptoms worsen or if there are any questions or concerns that arise at home          Amount and/or Complexity of Data Reviewed  Tests in the radiology section of CPT®: ordered and reviewed        Disposition  Final diagnoses:   Neck muscle spasm     Time reflects when diagnosis was documented in both MDM as applicable and the Disposition within this note     Time User Action Codes Description Comment    12/1/2022  7:04 PM Dulcy Olden Add [L87 443] Neck muscle spasm     12/1/2022  7:04 PM Dulcy Olden Add [M54 12] Cervical radiculopathy     12/1/2022  7:05 PM Dulcy Olden Remove [H42 47] Cervical radiculopathy       ED Disposition     ED Disposition   Discharge    Condition   Stable    Date/Time   u Dec 1, 2022  7:04 PM    Comment   Uyen Horowitz discharge to home/self care                 Follow-up Information     Follow up With Specialties Details Why Contact Info Additional Information    Bisi Alejandro MD Internal Medicine  If symptoms worsen [address] [address]  [phone]       [facility] Physical Therapy In 1 week  [phone]          Discharge Medication List as of 12/1/2022  7:07 PM      START taking these medications    Details   methocarbamol (ROBAXIN) 500 mg tablet Take 1 tablet (500 mg total) by mouth 2 (two) times a day as needed for muscle spasms, Starting Thu 12/1/2022, Print      naproxen (Naprosyn) 500 mg tablet Take 1 tablet (500 mg total) by mouth 2 (two) times a day as needed for mild pain (take with food), Starting Thu 12/1/2022, Print         CONTINUE these medications which have NOT CHANGED    Details   ascorbic acid (VITAMIN C) 1000 MG tablet Take by mouth as needed , Historical Med      DOCOSAHEXAENOIC ACID PO Take by mouth , Historical Med      ibuprofen (MOTRIN) 100 mg/5 mL suspension Take by mouth as needed for mild pain, Historical Med      meclizine (ANTIVERT) 25 mg tablet Take 1 tablet (25 mg total) by mouth 3 (three) times a day as needed for dizziness, Starting Fri 12/3/2021, Normal      ondansetron (Zofran ODT) 4 mg disintegrating tablet Take 1 tablet (4 mg total) by mouth every 6 (six) hours as needed for nausea or vomiting, Starting Fri 12/3/2021, Normal      simethicone (MYLICON,GAS-X) 546 MG CAPS Take 1 capsule (125 mg total) by mouth every 6 (six) hours as needed for flatulence, Starting Fri 12/3/2021, Normal             No discharge procedures on file      PDMP Review       Value Time User    PDMP Reviewed  Yes 9/20/2021 12:36 PM Debbi Salas PA-C          ED Provider  Electronically Signed by           JEFFERY Stephenson  12/01/22 1930

## 2023-01-23 ENCOUNTER — VBI (OUTPATIENT)
Dept: ADMINISTRATIVE | Facility: OTHER | Age: 43
End: 2023-01-23

## 2023-03-13 ENCOUNTER — HOSPITAL ENCOUNTER (OUTPATIENT)
Dept: MAMMOGRAPHY | Facility: CLINIC | Age: 43
Discharge: HOME | End: 2023-03-13

## 2023-03-13 ENCOUNTER — HOSPITAL ENCOUNTER (OUTPATIENT)
Dept: ULTRASOUND IMAGING | Facility: CLINIC | Age: 43
Discharge: HOME | End: 2023-03-13

## 2023-03-13 VITALS — HEIGHT: 69 IN | WEIGHT: 162 LBS | BODY MASS INDEX: 23.99 KG/M2

## 2023-03-13 DIAGNOSIS — R92.8 ABNORMAL MAMMOGRAM: ICD-10-CM

## 2023-03-17 ENCOUNTER — TELEPHONE (OUTPATIENT)
Dept: HEMATOLOGY ONCOLOGY | Facility: CLINIC | Age: 43
End: 2023-03-17

## 2023-03-17 NOTE — TELEPHONE ENCOUNTER
Patient Call Form   Reason for patient call? Patient calling in with questions about her care  Patient had difficulty clarifying what information she needed  Patient states she will call back at a later time  Patient's primary oncologist? Dr Saeed Horne    Name of person the patient was calling for? Does the patient issue require a call back? No   If call back required, inform patient that the message will be forwarded to the team and someone will get back to them as soon as possible    Did you relay this information to the patient?  N/A

## 2023-06-19 ENCOUNTER — OFFICE VISIT (OUTPATIENT)
Age: 43
End: 2023-06-19
Payer: COMMERCIAL

## 2023-06-19 ENCOUNTER — APPOINTMENT (OUTPATIENT)
Age: 43
End: 2023-06-19
Payer: COMMERCIAL

## 2023-06-19 VITALS
HEIGHT: 69 IN | HEART RATE: 76 BPM | TEMPERATURE: 99 F | OXYGEN SATURATION: 98 % | WEIGHT: 158.6 LBS | RESPIRATION RATE: 14 BRPM | SYSTOLIC BLOOD PRESSURE: 102 MMHG | DIASTOLIC BLOOD PRESSURE: 66 MMHG | BODY MASS INDEX: 23.49 KG/M2

## 2023-06-19 DIAGNOSIS — Z13.1 SCREENING FOR DIABETES MELLITUS: ICD-10-CM

## 2023-06-19 DIAGNOSIS — R51.9 CHRONIC NONINTRACTABLE HEADACHE, UNSPECIFIED HEADACHE TYPE: ICD-10-CM

## 2023-06-19 DIAGNOSIS — Z13.220 ENCOUNTER FOR LIPID SCREENING FOR CARDIOVASCULAR DISEASE: ICD-10-CM

## 2023-06-19 DIAGNOSIS — G47.00 INSOMNIA, UNSPECIFIED TYPE: ICD-10-CM

## 2023-06-19 DIAGNOSIS — R44.3 HALLUCINATION: ICD-10-CM

## 2023-06-19 DIAGNOSIS — Z00.00 HEALTHCARE MAINTENANCE: ICD-10-CM

## 2023-06-19 DIAGNOSIS — R44.3 HALLUCINATION: Primary | ICD-10-CM

## 2023-06-19 DIAGNOSIS — G89.29 CHRONIC NONINTRACTABLE HEADACHE, UNSPECIFIED HEADACHE TYPE: ICD-10-CM

## 2023-06-19 DIAGNOSIS — Z13.29 SCREENING FOR THYROID DISORDER: ICD-10-CM

## 2023-06-19 DIAGNOSIS — Z13.6 ENCOUNTER FOR LIPID SCREENING FOR CARDIOVASCULAR DISEASE: ICD-10-CM

## 2023-06-19 LAB
ALBUMIN SERPL BCP-MCNC: 3.7 G/DL (ref 3.5–5)
ALP SERPL-CCNC: 71 U/L (ref 46–116)
ALT SERPL W P-5'-P-CCNC: 22 U/L (ref 12–78)
ANION GAP SERPL CALCULATED.3IONS-SCNC: 6 MMOL/L (ref 4–13)
AST SERPL W P-5'-P-CCNC: 15 U/L (ref 5–45)
BASOPHILS # BLD AUTO: 0.07 THOUSANDS/ÂΜL (ref 0–0.1)
BASOPHILS NFR BLD AUTO: 1 % (ref 0–1)
BILIRUB SERPL-MCNC: 0.46 MG/DL (ref 0.2–1)
BILIRUB UR QL STRIP: NEGATIVE
BUN SERPL-MCNC: 11 MG/DL (ref 5–25)
CALCIUM SERPL-MCNC: 9.2 MG/DL (ref 8.3–10.1)
CHLORIDE SERPL-SCNC: 110 MMOL/L (ref 96–108)
CHOLEST SERPL-MCNC: 157 MG/DL
CLARITY UR: CLEAR
CO2 SERPL-SCNC: 22 MMOL/L (ref 21–32)
COLOR UR: COLORLESS
CREAT SERPL-MCNC: 0.45 MG/DL (ref 0.6–1.3)
EOSINOPHIL # BLD AUTO: 0.1 THOUSAND/ÂΜL (ref 0–0.61)
EOSINOPHIL NFR BLD AUTO: 1 % (ref 0–6)
ERYTHROCYTE [DISTWIDTH] IN BLOOD BY AUTOMATED COUNT: 22.5 % (ref 11.6–15.1)
GFR SERPL CREATININE-BSD FRML MDRD: 123 ML/MIN/1.73SQ M
GLUCOSE P FAST SERPL-MCNC: 80 MG/DL (ref 65–99)
GLUCOSE UR STRIP-MCNC: NEGATIVE MG/DL
HCT VFR BLD AUTO: 33.8 % (ref 34.8–46.1)
HDLC SERPL-MCNC: 47 MG/DL
HGB BLD-MCNC: 10.1 G/DL (ref 11.5–15.4)
HGB UR QL STRIP.AUTO: NEGATIVE
IMM GRANULOCYTES # BLD AUTO: 0.03 THOUSAND/UL (ref 0–0.2)
IMM GRANULOCYTES NFR BLD AUTO: 0 % (ref 0–2)
KETONES UR STRIP-MCNC: NEGATIVE MG/DL
LDLC SERPL CALC-MCNC: 84 MG/DL (ref 0–100)
LEUKOCYTE ESTERASE UR QL STRIP: NEGATIVE
LYMPHOCYTES # BLD AUTO: 2.2 THOUSANDS/ÂΜL (ref 0.6–4.47)
LYMPHOCYTES NFR BLD AUTO: 30 % (ref 14–44)
MCH RBC QN AUTO: 22.5 PG (ref 26.8–34.3)
MCHC RBC AUTO-ENTMCNC: 29.9 G/DL (ref 31.4–37.4)
MCV RBC AUTO: 75 FL (ref 82–98)
MONOCYTES # BLD AUTO: 0.74 THOUSAND/ÂΜL (ref 0.17–1.22)
MONOCYTES NFR BLD AUTO: 10 % (ref 4–12)
NEUTROPHILS # BLD AUTO: 4.13 THOUSANDS/ÂΜL (ref 1.85–7.62)
NEUTS SEG NFR BLD AUTO: 58 % (ref 43–75)
NITRITE UR QL STRIP: NEGATIVE
NRBC BLD AUTO-RTO: 0 /100 WBCS
PH UR STRIP.AUTO: 6 [PH]
PLATELET # BLD AUTO: 273 THOUSANDS/UL (ref 149–390)
PMV BLD AUTO: 10.7 FL (ref 8.9–12.7)
POTASSIUM SERPL-SCNC: 3.5 MMOL/L (ref 3.5–5.3)
PROT SERPL-MCNC: 7.7 G/DL (ref 6.4–8.4)
PROT UR STRIP-MCNC: NEGATIVE MG/DL
RBC # BLD AUTO: 4.48 MILLION/UL (ref 3.81–5.12)
SODIUM SERPL-SCNC: 138 MMOL/L (ref 135–147)
SP GR UR STRIP.AUTO: 1 (ref 1–1.03)
TRIGL SERPL-MCNC: 128 MG/DL
UROBILINOGEN UR STRIP-ACNC: <2 MG/DL
WBC # BLD AUTO: 7.27 THOUSAND/UL (ref 4.31–10.16)

## 2023-06-19 PROCEDURE — 99215 OFFICE O/P EST HI 40 MIN: CPT

## 2023-06-19 PROCEDURE — 80307 DRUG TEST PRSMV CHEM ANLYZR: CPT

## 2023-06-19 PROCEDURE — 83540 ASSAY OF IRON: CPT

## 2023-06-19 PROCEDURE — 83550 IRON BINDING TEST: CPT

## 2023-06-19 PROCEDURE — 83036 HEMOGLOBIN GLYCOSYLATED A1C: CPT

## 2023-06-19 PROCEDURE — 80061 LIPID PANEL: CPT

## 2023-06-19 PROCEDURE — 80053 COMPREHEN METABOLIC PANEL: CPT

## 2023-06-19 PROCEDURE — 84703 CHORIONIC GONADOTROPIN ASSAY: CPT

## 2023-06-19 PROCEDURE — 36415 COLL VENOUS BLD VENIPUNCTURE: CPT

## 2023-06-19 PROCEDURE — 84443 ASSAY THYROID STIM HORMONE: CPT

## 2023-06-19 PROCEDURE — 85025 COMPLETE CBC W/AUTO DIFF WBC: CPT

## 2023-06-19 PROCEDURE — 82728 ASSAY OF FERRITIN: CPT

## 2023-06-19 PROCEDURE — 81003 URINALYSIS AUTO W/O SCOPE: CPT

## 2023-06-19 RX ORDER — DOXYCYCLINE HYCLATE 50 MG/1
324 CAPSULE, GELATIN COATED ORAL
COMMUNITY

## 2023-06-19 RX ORDER — OLANZAPINE 2.5 MG/1
2.5 TABLET ORAL
Qty: 30 TABLET | Refills: 1 | Status: SHIPPED | OUTPATIENT
Start: 2023-06-19

## 2023-06-19 NOTE — PATIENT INSTRUCTIONS
Start Zyprexa 2 5mg at bedtime  Schedule CT scan    Hallucinations   AMBULATORY CARE:   Hallucinations  are things you see, hear, feel, taste, or smell that seem real but are not  Some hallucinations are temporary  Hallucinations that continue, interfere with daily activities, or worsen may be a sign of a serious medical or mental condition that needs treatment  Types of hallucinations: Auditory  means you hear things, such as music, buzzing, or ringing  You may hear voices even though no one else is in the room  The voices may say negative things about you or tell you to harm yourself or others  You may hear the voice of a loved one who recently passed away  Visual  means you see things, such as a person or object that is not real  Flashes of light or shapes are other examples  Another example is an object that is real but looks different to you than it does to others  Tactile  means you feel things, such as an object that is not real  You may feel like something is touching you or is crawling on or in your skin  You may also feel that your body is being cut or torn  You may feel like something is in a body part, such as your stomach, even though tests show nothing is there  Olfactory  means you smell something that is not real  The smell may make you gag or choke if it is not pleasant  You may smell something good, such as food or flowers  Olfactory hallucinations may be a sign of a serious medical condition that needs treatment, such as a brain tumor  Gustatory  means you taste things that are not real  You may taste something even when your mouth is empty  Your food may taste rotten or sour even though others eating the same food think it tastes fine  Call 911 if you or someone else notices any of the following: You want to harm yourself or someone else  You hear voices telling you to harm yourself or someone else  You have a seizure      You are confused, do not know where you are, or are not making sense when you speak  Seek care immediately if:   Your hallucinations worsen or return after treatment  You vomit several times in a row  Your heartbeat or breathing is faster or slower than usual     You have trouble breathing or shortness of breath  Contact your healthcare provider if:   You have new hallucinations  You have questions or concerns about your condition or care  Treatment  may include any of the following:  Medicines  may be given to stop the hallucinations, reduce anxiety, or relax your muscles  A behavior therapist  may help you recognize and manage hallucinations  The therapist may teach methods such as the talk-through method  You will learn to tell yourself that the hallucination is not real and what to do when it ends  Follow up with your healthcare provider as directed:  Write down your questions so you remember to ask them during your visits  © Copyright [employer] 2022 Information is for End User's use only and may not be sold, redistributed or otherwise used for commercial purposes  The above information is an  only  It is not intended as medical advice for individual conditions or treatments  Talk to your doctor, nurse or pharmacist before following any medical regimen to see if it is safe and effective for you

## 2023-06-20 ENCOUNTER — TELEPHONE (OUTPATIENT)
Dept: PSYCHIATRY | Facility: CLINIC | Age: 43
End: 2023-06-20

## 2023-06-20 DIAGNOSIS — D64.9 ANEMIA, UNSPECIFIED TYPE: Primary | ICD-10-CM

## 2023-06-20 LAB
EST. AVERAGE GLUCOSE BLD GHB EST-MCNC: 82 MG/DL
FERRITIN SERPL-MCNC: 4 NG/ML (ref 11–307)
HBA1C MFR BLD: 4.5 %
HCG SERPL QL: NEGATIVE
IRON SATN MFR SERPL: 5 % (ref 15–50)
IRON SERPL-MCNC: 20 UG/DL (ref 50–170)
TIBC SERPL-MCNC: 428 UG/DL (ref 250–450)
TSH SERPL DL<=0.05 MIU/L-ACNC: 0.89 UIU/ML (ref 0.45–4.5)

## 2023-06-20 NOTE — TELEPHONE ENCOUNTER
"Behavioral Health Outpatient Intake Questions    Referred By   : pcp    Please advise interviewee that they need to answer all questions truthfully to allow for best care, and any misrepresentations of information may affect their ability to be seen at this clinic   => Was this discussed? Yes     If Minor Child (under age 25)    Who is/are the legal guardian(s) of the child? Is there a custody agreement? No     • If \"YES\"- Custody orders must be obtained prior to scheduling the first appointment  • In addition, Consent to Treatment must be signed by all legal guardians prior to scheduling the first appointment    • If \"NO\"- Consent to Treatment must be signed by all legal guardians prior to scheduling the first appointment    Behavioral Health Outpatient Intake History -     Presenting Problem (in patient's own words): Auditory and visual hallucinations- x 9 months; no previous psych hx      Are there any communication barriers for this patient? No                                               If yes, please describe barriers:   • If there is a unique situation, please refer to [address] [place] [address] for final determination  Are you taking any psychiatric medications? Yes   •   If \"YES\" -What are they Zyprexa  •   If \"YES\" -Who prescribes? pcp    Has the Patient previously received outpatient Talk Therapy or Medication Management from [facility]  No     •    If \"YES\"- When, Where and with Whom? •    If \"NO\" -Has Patient received these services elsewhere? •   If \"YES\" -When, Where, and with Whom? Has the Patient abused alcohol or other substances in the last 6 months ? No  No concerns of substance abuse are reported  •  If \"YES\" -What substance, How much, How often? •  If illegal substance: Refer to [address] (for [facility]) or [facility]    •  If Alcohol in excess of 10 drinks per week:  Refer to [address] (for [facility]) or [address] [place] [address] History-     Is this treatment " "court ordered? If \"yes \"send to :  • Talk Therapy : Send to [facility] for final determination   • Med Management: Send to Dr Samantha Martini for final determination     Has the Patient been convicted of a felony? No   If \"Yes\" send to -When, What? • Talk Therapy : Send to [facility] for final determination   • Med Management: Send to Dr Samantha Martini for final determination     ACCEPTED as a patient Yes  • If \"Yes\" Appointment Date: 8/21 with Angelica    Referred Elsewhere? No  • If “Yes” - (Where? Ex: [facility], [facility], [address] [place] [address], etc )       Name of Insurance Co:[facility]  Insurance ID#    [id number]       Insurance Phone #  If ins is primary or secondary? If patient is a minor, parents information such as Name, D  O B of guarantor    "

## 2023-06-20 NOTE — PROGRESS NOTES
E-Consult unable to be completed  Request too complex for E-Consult  Please place ambulatory referral for patient to be seen in person

## 2023-06-21 LAB
AMPHETAMINES UR QL SCN: NEGATIVE NG/ML
BARBITURATES UR QL SCN: NEGATIVE NG/ML
BENZODIAZ UR QL: NEGATIVE NG/ML
BZE UR QL: NEGATIVE NG/ML
CANNABINOIDS UR QL SCN: NEGATIVE NG/ML
METHADONE UR QL SCN: NEGATIVE NG/ML
OPIATES UR QL: NEGATIVE NG/ML
PCP UR QL: NEGATIVE NG/ML
PROPOXYPH UR QL SCN: NEGATIVE NG/ML

## 2023-06-21 NOTE — PROGRESS NOTES
[facility] - [facility] PRIMARY CARE [place]    NAME: Josesito Cuello  AGE: 44 y o  SEX: female  : 1980     DATE: 2023     Assessment and Plan:     1  Hallucination  Reports visual and auditory hallucinations which started in 2022  Patient states that she is seeing snakes crawl out of her fingers and toes and crawling on her body  She is fearful that they will bite her  She reports increase in the hallucinations now occurring all day  This is disrupting sleep  Patient also reporting that she is hearing voices with indiscernible words  She denies suicidal ideation or homicidal ideation  Patient is calm  Denies psychiatric history  There is no family history of schizophrenia that she reports  Denies substance use  Check the following labs as well as CT of the brain she has had associated headaches that started around the same time  Consult to psychiatry  We will start Zyprexa 2 5 mg nightly   - TSH, 3rd generation with Free T4 reflex; Future  - Pregnancy Test (HCG Qualitative); Future  - Comprehensive metabolic panel; Future  - CBC and differential; Future  - UA w Reflex to Microscopic w Reflex to Culture -Lab Collect; Future  - Toxicology screen, urine  - OLANZapine (ZyPREXA) 2 5 mg tablet; Take 1 tablet (2 5 mg total) by mouth daily at bedtime  Dispense: 30 tablet; Refill: 1  - AMB E-CONSULT TO PSYCHIATRY  - Ambulatory Referral to Psychiatry; Future  - CT head wo contrast; Future    2  Chronic nonintractable headache, unspecified headache type  Patient reports headaches that started in 2022 and have increased in intensity  They start in the back of the neck  She has been using over-the-counter pain medication which helps some  She has been seen in the ER for neck spasms cervical C-spine was negative  - Pregnancy Test (HCG Qualitative); Future  - CBC and differential; Future  - CT head wo contrast; Future    3   Screening for thyroid disorder  Patient with difficulty sleeping, hallucinations  - TSH, 3rd generation with Free T4 reflex; Future    4  Encounter for lipid screening for cardiovascular disease    - Lipid Panel with Direct LDL reflex; Future    5  Healthcare maintenance    - Pregnancy Test (HCG Qualitative); Future  - CBC and differential; Future    6  Screening for diabetes mellitus    - HEMOGLOBIN A1C W/ EAG ESTIMATION; Future    7  Insomnia, unspecified type    - AMB E-CONSULT TO PSYCHIATRY  - Ambulatory Referral to Psychiatry; Future        Return in about 3 weeks (around 7/10/2023) for Recheck  Chief Complaint:     Chief Complaint   Patient presents with   • Other     Thinks she has snakes biting her and sees snakes that are not there  History of Present Illness:     Valentino Fore presents to the office today for evaluation of auditory and visual hallucinations  Patient is accompanied by her   There is a language barrier  Patient reports that she started seeing snakes crawling on her in August 2022  Reports that the hallucinations have increased and are happening daily  She is fearful of falling asleep that they will bite her  She sees them coming out of her fingers and toes  She is also hearing voices  They are not telling her to do anything she describes them more as indiscernible words  She reports that this is affecting her ability to concentrate on things  Also reports that it is making sleep difficult  She denies substance use or drug use  She denies suicidal or homicidal ideation  Only medication she is taking at this time is iron supplement  She denies previous psychiatric history  There is no family history of schizophrenia or psychiatric illness  Other past medical history she states is uterine fibroids with menorrhagia  She also is getting headaches that start in the back of her neck  She states that happening a couple times a week which is also an increase    Denies visual changes dizziness or nausea with them "Review of Systems:     Review of Systems   Constitutional: Positive for fatigue  HENT: Negative  Respiratory: Negative  Gastrointestinal: Negative  Endocrine: Negative  Genitourinary: Positive for menstrual problem  Musculoskeletal: Negative  Skin: Negative  Neurological: Positive for headaches  Negative for dizziness  Psychiatric/Behavioral: Positive for decreased concentration, hallucinations and sleep disturbance  Negative for confusion, self-injury and suicidal ideas  Problem List:     Patient Active Problem List   Diagnosis   • Non-ulcer dyspepsia   • Sciatica of left side   • Menorrhagia with regular cycle   • Intramural and subserous leiomyoma of uterus   • Subserous leiomyoma of uterus   • Frequency of urination   • GERD (gastroesophageal reflux disease)        Objective:     /66 (BP Location: Right arm, Patient Position: Sitting, Cuff Size: Standard)   Pulse 76   Temp 99 °F (37 2 °C) (Tympanic)   Resp 14   Ht 5' 9\" (1 753 m)   Wt 71 9 kg (158 lb 9 6 oz)   SpO2 98%   BMI 23 42 kg/m²     Physical Exam  Vitals and nursing note reviewed  Constitutional:       General: She is not in acute distress  Appearance: Normal appearance  She is well-developed and normal weight  HENT:      Head: Normocephalic and atraumatic  Right Ear: Tympanic membrane normal       Left Ear: Tympanic membrane normal       Nose: Nose normal  No congestion or rhinorrhea  Mouth/Throat:      Mouth: Mucous membranes are moist       Pharynx: Oropharynx is clear  Eyes:      Extraocular Movements: Extraocular movements intact  Conjunctiva/sclera: Conjunctivae normal       Pupils: Pupils are equal, round, and reactive to light  Neck:      Thyroid: No thyromegaly  Cardiovascular:      Rate and Rhythm: Normal rate and regular rhythm  Pulses: Normal pulses  Heart sounds: Normal heart sounds  No murmur heard    Pulmonary:      Effort: Pulmonary effort is normal  No " respiratory distress  Breath sounds: Normal breath sounds  Abdominal:      General: Bowel sounds are normal       Palpations: Abdomen is soft  Tenderness: There is no abdominal tenderness  Musculoskeletal:         General: Normal range of motion  Cervical back: Normal range of motion and neck supple  Lymphadenopathy:      Cervical: No cervical adenopathy  Skin:     General: Skin is warm and dry  Capillary Refill: Capillary refill takes less than 2 seconds  Neurological:      General: No focal deficit present  Mental Status: She is alert and oriented to person, place, and time  Cranial Nerves: Cranial nerves 2-12 are intact  Sensory: Sensation is intact  Motor: Motor function is intact  Coordination: Romberg sign negative  Finger-Nose-Finger Test (Slow to perform the task) normal       Gait: Gait is intact  Comments: EOM exam induced dizziness  Psychiatric:         Mood and Affect: Mood normal          Behavior: Behavior normal          Thought Content: Thought content normal          Judgment: Judgment normal          I spent 45 minutes with this patient      [address] [place] [address], [address] [place] [address],[address] PRIMARY CARE [place]

## 2023-07-10 ENCOUNTER — OFFICE VISIT (OUTPATIENT)
Age: 43
End: 2023-07-10
Payer: COMMERCIAL

## 2023-07-10 VITALS
WEIGHT: 161 LBS | DIASTOLIC BLOOD PRESSURE: 60 MMHG | HEART RATE: 75 BPM | SYSTOLIC BLOOD PRESSURE: 98 MMHG | RESPIRATION RATE: 18 BRPM | TEMPERATURE: 98.5 F | BODY MASS INDEX: 26.82 KG/M2 | OXYGEN SATURATION: 97 % | HEIGHT: 65 IN

## 2023-07-10 DIAGNOSIS — R44.1 VISUAL HALLUCINATIONS: Primary | ICD-10-CM

## 2023-07-10 PROCEDURE — 99213 OFFICE O/P EST LOW 20 MIN: CPT | Performed by: INTERNAL MEDICINE

## 2023-07-10 NOTE — PROGRESS NOTES
Assessment/Plan:       Diagnoses and all orders for this visit:    Visual hallucinations  -     CT head wo contrast; Future                Subjective:      Patient ID: Annabella Singer is a 44 y.o. female. 25-year-old seen here 2 weeks ago with a report of visual hallucinations. Please refer to that note. Diagnostic plan was laboratory testing and brain CT brain CT as yet to be done. Diagnostic testing was obtained the only abnormality was iron was quite low; this is to be expected of a menstruating female  Iron repletion was initiated  Zyprexa was initiated    Comes in today. The patient reports hallucinations have disappeared but now she feels like she has memory issues. As her primary language is Serbian and her English well okay is not fabulous, it is difficult to get new ones    Recommendation here is to discontinue the Zyprexa and follow-up in 2 weeks while obtaining the brain CT. The following portions of the patient's history were reviewed and updated as appropriate:   She has a past medical history of GERD (gastroesophageal reflux disease) and Sciatica of left side. ,  does not have any pertinent problems on file. ,   has a past surgical history that includes  section; US guided breast biopsy right complete (Right, 2021); EGD (); US breast bruce  right (Right, 2021); and Breast lumpectomy (Right, 2021). ,  family history includes Breast cancer (age of onset: 80) in her maternal aunt; Heart attack in her father; Heart disease in her father; No Known Problems in her maternal grandfather, maternal grandmother, mother, paternal grandfather, paternal grandmother, sister, and son.,   reports that she has never smoked. She has never used smokeless tobacco. She reports that she does not drink alcohol and does not use drugs. ,  has No Known Allergies. .  Current Outpatient Medications   Medication Sig Dispense Refill   • ascorbic acid (VITAMIN C) 1000 MG tablet Take by mouth as needed       No current facility-administered medications for this visit. Review of Systems   Psychiatric/Behavioral: Positive for confusion. All other systems reviewed and are negative. Objective:  Vitals:    07/10/23 1643   BP: 98/60   Pulse: 75   Resp: 18   Temp: 98.5 °F (36.9 °C)   SpO2: 97%      Physical Exam  Constitutional:       Appearance: Normal appearance. Cardiovascular:      Rate and Rhythm: Normal rate. Pulmonary:      Effort: Pulmonary effort is normal.   Neurological:      General: No focal deficit present. Mental Status: She is alert.    Psychiatric:         Mood and Affect: Mood normal.         Judgment: Judgment normal.           Patient Instructions   Obtain a CAT scan  Stop the Zyprexa  Follow-up here in 1 to 2 weeks    I will speak with behavioral health depending upon what happens

## 2023-07-10 NOTE — PATIENT INSTRUCTIONS
Obtain a CAT scan  Stop the Zyprexa  Follow-up here in 1 to 2 weeks    I will speak with behavioral health depending upon what happens

## 2023-07-25 ENCOUNTER — TELEPHONE (OUTPATIENT)
Age: 43
End: 2023-07-25

## 2023-07-25 DIAGNOSIS — R44.1 VISUAL HALLUCINATIONS: Primary | ICD-10-CM

## 2023-07-25 NOTE — TELEPHONE ENCOUNTER
Spoke with: patient's   Re:  Cancelled CT/Referral to neurologoy    Provider's message/resuts/instructions relayed in full detail.   Comments:

## 2023-07-25 NOTE — TELEPHONE ENCOUNTER
Spouse called to see if the CT was approved. I see it is pending. Please call back on statis.   Pt [phone]

## 2023-07-25 NOTE — TELEPHONE ENCOUNTER
Message for Dr Lucy Hooper:  Priyank Mak not approved yet  Test is scheduled for Sun 7/30    In order to approve the CT Head; they need 's notes that say you have memory tests (MMSE) or MoCA with low scores less than 26. Blood tests also have to be done for this problem (thyroid tests, complete blood count, complete metabolic panel, vitamin P71 levels)  We need to know the blood markers are not the cause of your memory problems. Also need 's notes that say why you cannot do a different test- MRI     It's KARISSA's medical view that the Brain CT will be DENIED because it's not medically necessary. Are you going to order labs, MMSE and/or MoCA test in order to get this approved?     Let me know

## 2023-07-27 NOTE — TELEPHONE ENCOUNTER
CT Head wasn't approved- Dr Lizeth Mahan canceled the CT Head  He put in a referral for the pt to see a Neurologist    I left a vm with her  today with the p# for Neurology; so an appt can be made. Also told him that the CT Head was canceled and not approved- Dr Lizeth Mahan canceled it. The insurance was looking for memory testing to be done by Dr Lizeth Mahan.

## 2023-07-27 NOTE — TELEPHONE ENCOUNTER
Called Kacie's  today and told him the CT Head was canceled by Dr Nancy Goins- it wasn't approved    Told her  that the dr referred her to Neurology- left p# on a vm today with him.

## 2023-08-09 ENCOUNTER — TELEPHONE (OUTPATIENT)
Age: 43
End: 2023-08-09

## 2023-08-09 NOTE — TELEPHONE ENCOUNTER
Neurology received referral    ~ See note from Dr. Cailin Stock on Referral in patients chart~    Questions  1. Is memory loss the patients only issue? 2. Is patient still having visual hallucinations? - ( Says resolved in patients chart)  3. Does patient only speak Japanese? Need ? Or does she speak some English? Depends on the answers to this questions on who the referral is assigned to.     Please Advise: [phone] / Option 1

## 2023-08-10 NOTE — TELEPHONE ENCOUNTER
She speaks only Japanese I don't know which variation.  Dewnied hallucinations through  but I'm not sure

## 2023-08-11 ENCOUNTER — TELEPHONE (OUTPATIENT)
Dept: NEUROLOGY | Facility: CLINIC | Age: 43
End: 2023-08-11

## 2023-08-11 NOTE — TELEPHONE ENCOUNTER
8/10/23 at 3:33    I am returning your call in regards to Kacie Gomes, date of birth 1980. Dr. Bui's reply was, she speaks only Maltese. I don't know which variation. She denied hallucinations to , but he's not sure. So she's having issues and that's why he sent over and that is why the  always accompanies her, and I will tell you as the nurse, she does speak a little english when she's trying to explain things. If you have any other questions, please call us [phone]. Thank you.    See neurology referral

## 2023-08-18 ENCOUNTER — TELEPHONE (OUTPATIENT)
Dept: OTHER | Facility: OTHER | Age: 43
End: 2023-08-18

## 2023-08-21 ENCOUNTER — TELEPHONE (OUTPATIENT)
Dept: PSYCHIATRY | Facility: CLINIC | Age: 43
End: 2023-08-21

## 2023-08-21 NOTE — TELEPHONE ENCOUNTER
Pt is not able to schedule at any of the offices that I have to offer and is not available to schedule until after 3 pm. Pt request a outside resource packet be mailed  out

## 2023-08-21 NOTE — TELEPHONE ENCOUNTER
Writer spoke to  of patient to inform them that an  would be contacting them because they contacted the office and wanted to reschedule the patients appointment. With to the appointment being a new patient ASAP referral appointment writer forwarded information to intake department.  of patient wanted to reschedule appointment but keep it on Mondays as those were his days off.  asked if the patient could be seen in [facility]? Writer mentioned possibly the [place] office but that the  would discuss more.

## 2023-08-21 NOTE — TELEPHONE ENCOUNTER
A message was left for the patient to return call to reschedule new patient ASAP appointment. Pt may be interested in scheduling at the [facility].

## 2024-03-01 ENCOUNTER — APPOINTMENT (EMERGENCY)
Dept: CT IMAGING | Facility: HOSPITAL | Age: 44
End: 2024-03-01
Payer: COMMERCIAL

## 2024-03-01 ENCOUNTER — HOSPITAL ENCOUNTER (EMERGENCY)
Facility: HOSPITAL | Age: 44
Discharge: HOME | End: 2024-03-01
Attending: EMERGENCY MEDICINE
Payer: COMMERCIAL

## 2024-03-01 VITALS
RESPIRATION RATE: 16 BRPM | HEART RATE: 81 BPM | DIASTOLIC BLOOD PRESSURE: 61 MMHG | TEMPERATURE: 97.8 F | OXYGEN SATURATION: 95 % | SYSTOLIC BLOOD PRESSURE: 110 MMHG

## 2024-03-01 DIAGNOSIS — R10.9 ABDOMINAL PAIN: Primary | ICD-10-CM

## 2024-03-01 DIAGNOSIS — K76.9 LIVER LESION: ICD-10-CM

## 2024-03-01 DIAGNOSIS — D25.9 FIBROID, UTERINE: ICD-10-CM

## 2024-03-01 LAB
ALBUMIN SERPL BCP-MCNC: 4.6 G/DL (ref 3.5–5)
ALP SERPL-CCNC: 68 U/L (ref 34–104)
ALT SERPL W P-5'-P-CCNC: 11 U/L (ref 7–52)
ANION GAP SERPL CALCULATED.3IONS-SCNC: 9 MMOL/L
AST SERPL W P-5'-P-CCNC: 16 U/L (ref 13–39)
BACTERIA UR QL AUTO: NORMAL /HPF
BASOPHILS # BLD AUTO: 0.06 THOUSANDS/ÂΜL (ref 0–0.1)
BASOPHILS NFR BLD AUTO: 1 % (ref 0–1)
BILIRUB DIRECT SERPL-MCNC: 0.07 MG/DL (ref 0–0.2)
BILIRUB SERPL-MCNC: 0.5 MG/DL (ref 0.2–1)
BILIRUB UR QL STRIP: NEGATIVE
BUN SERPL-MCNC: 12 MG/DL (ref 5–25)
CALCIUM SERPL-MCNC: 10 MG/DL (ref 8.4–10.2)
CHLORIDE SERPL-SCNC: 105 MMOL/L (ref 96–108)
CLARITY UR: CLEAR
CO2 SERPL-SCNC: 24 MMOL/L (ref 21–32)
COLOR UR: ABNORMAL
CREAT SERPL-MCNC: 0.47 MG/DL (ref 0.6–1.3)
EOSINOPHIL # BLD AUTO: 0.15 THOUSAND/ÂΜL (ref 0–0.61)
EOSINOPHIL NFR BLD AUTO: 2 % (ref 0–6)
ERYTHROCYTE [DISTWIDTH] IN BLOOD BY AUTOMATED COUNT: 16.4 % (ref 11.6–15.1)
GFR SERPL CREATININE-BSD FRML MDRD: 122 ML/MIN/1.73SQ M
GLUCOSE SERPL-MCNC: 105 MG/DL (ref 65–140)
GLUCOSE UR STRIP-MCNC: NEGATIVE MG/DL
HCG SERPL QL: NEGATIVE
HCT VFR BLD AUTO: 37.3 % (ref 34.8–46.1)
HGB BLD-MCNC: 11.9 G/DL (ref 11.5–15.4)
HGB UR QL STRIP.AUTO: ABNORMAL
IMM GRANULOCYTES # BLD AUTO: 0.03 THOUSAND/UL (ref 0–0.2)
IMM GRANULOCYTES NFR BLD AUTO: 0 % (ref 0–2)
KETONES UR STRIP-MCNC: NEGATIVE MG/DL
LEUKOCYTE ESTERASE UR QL STRIP: NEGATIVE
LYMPHOCYTES # BLD AUTO: 2.57 THOUSANDS/ÂΜL (ref 0.6–4.47)
LYMPHOCYTES NFR BLD AUTO: 30 % (ref 14–44)
MCH RBC QN AUTO: 26.3 PG (ref 26.8–34.3)
MCHC RBC AUTO-ENTMCNC: 31.9 G/DL (ref 31.4–37.4)
MCV RBC AUTO: 82 FL (ref 82–98)
MONOCYTES # BLD AUTO: 0.66 THOUSAND/ÂΜL (ref 0.17–1.22)
MONOCYTES NFR BLD AUTO: 8 % (ref 4–12)
NEUTROPHILS # BLD AUTO: 5.11 THOUSANDS/ÂΜL (ref 1.85–7.62)
NEUTS SEG NFR BLD AUTO: 59 % (ref 43–75)
NITRITE UR QL STRIP: NEGATIVE
NON-SQ EPI CELLS URNS QL MICRO: NORMAL /HPF
NRBC BLD AUTO-RTO: 0 /100 WBCS
PH UR STRIP.AUTO: 5.5 [PH]
PLATELET # BLD AUTO: 286 THOUSANDS/UL (ref 149–390)
PMV BLD AUTO: 9.9 FL (ref 8.9–12.7)
POTASSIUM SERPL-SCNC: 3.4 MMOL/L (ref 3.5–5.3)
PROT SERPL-MCNC: 7.7 G/DL (ref 6.4–8.4)
PROT UR STRIP-MCNC: NEGATIVE MG/DL
RBC # BLD AUTO: 4.53 MILLION/UL (ref 3.81–5.12)
RBC #/AREA URNS AUTO: NORMAL /HPF
SODIUM SERPL-SCNC: 138 MMOL/L (ref 135–147)
SP GR UR STRIP.AUTO: 1.01 (ref 1–1.03)
UROBILINOGEN UR STRIP-ACNC: <2 MG/DL
WBC # BLD AUTO: 8.58 THOUSAND/UL (ref 4.31–10.16)
WBC #/AREA URNS AUTO: NORMAL /HPF

## 2024-03-01 PROCEDURE — 85025 COMPLETE CBC W/AUTO DIFF WBC: CPT | Performed by: EMERGENCY MEDICINE

## 2024-03-01 PROCEDURE — 74177 CT ABD & PELVIS W/CONTRAST: CPT

## 2024-03-01 PROCEDURE — 99285 EMERGENCY DEPT VISIT HI MDM: CPT | Performed by: EMERGENCY MEDICINE

## 2024-03-01 PROCEDURE — 80048 BASIC METABOLIC PNL TOTAL CA: CPT | Performed by: EMERGENCY MEDICINE

## 2024-03-01 PROCEDURE — 99284 EMERGENCY DEPT VISIT MOD MDM: CPT

## 2024-03-01 PROCEDURE — 84703 CHORIONIC GONADOTROPIN ASSAY: CPT | Performed by: EMERGENCY MEDICINE

## 2024-03-01 PROCEDURE — 80076 HEPATIC FUNCTION PANEL: CPT | Performed by: EMERGENCY MEDICINE

## 2024-03-01 PROCEDURE — 36415 COLL VENOUS BLD VENIPUNCTURE: CPT | Performed by: EMERGENCY MEDICINE

## 2024-03-01 PROCEDURE — 81001 URINALYSIS AUTO W/SCOPE: CPT | Performed by: EMERGENCY MEDICINE

## 2024-03-01 RX ORDER — OXYCODONE HYDROCHLORIDE AND ACETAMINOPHEN 5; 325 MG/1; MG/1
1 TABLET ORAL EVERY 6 HOURS PRN
Qty: 20 TABLET | Refills: 0 | Status: SHIPPED | OUTPATIENT
Start: 2024-03-01

## 2024-03-01 RX ORDER — OXYCODONE HYDROCHLORIDE AND ACETAMINOPHEN 5; 325 MG/1; MG/1
1 TABLET ORAL ONCE
Status: COMPLETED | OUTPATIENT
Start: 2024-03-01 | End: 2024-03-01

## 2024-03-01 RX ADMIN — IOHEXOL 100 ML: 350 INJECTION, SOLUTION INTRAVENOUS at 18:26

## 2024-03-01 RX ADMIN — OXYCODONE HYDROCHLORIDE AND ACETAMINOPHEN 1 TABLET: 5; 325 TABLET ORAL at 17:43

## 2024-03-01 NOTE — ED PROVIDER NOTES
History  Chief Complaint   Patient presents with    Abdominal Pain     Pt has known fibroids and has been to the OBGYN. Pt is complaining of bad pain today      HPI patient is a 43-year-old female she reports right-sided lower abdominal pain that has been increasingly severe over the last 2 days.  Patient describes an swelling in her right lower abdomen like she feels like an orange is expanding and there.  Patient denies any vomiting or diarrhea.  She has any rash.  Patient was told she had fibroids in the past by her gynecologist.  Review of her records show she did have an ultrasound which showed fibroids.  She denies any active bleeding at this time.  She reports she did have an episode where she bled for 20 days.  Patient reports now uncomfortable right-sided pain and reports focally tender in the right lower quadrant.  Past medical history of uterine fibroids  sciatica reflux disease  Family history contributory  Social history, non-smoker no history of drug abuse    Prior to Admission Medications   Prescriptions Last Dose Informant Patient Reported? Taking?   ascorbic acid (VITAMIN C) 1000 MG tablet  Self Yes No   Sig: Take by mouth as needed      Facility-Administered Medications: None       Past Medical History:   Diagnosis Date    GERD (gastroesophageal reflux disease)     Sciatica of left side        Past Surgical History:   Procedure Laterality Date    BREAST LUMPECTOMY Right 2021    Procedure: BREAST JIMBO  DIRECTED LUMPECTOMY;  Surgeon: Dani Moore MD;  Location: [facility];  Service: Surgical Oncology     SECTION      EGD       BREAST JIMBO  NEEDLE LOC RIGHT Right 2021    US GUIDED BREAST BIOPSY RIGHT COMPLETE Right 2021       Family History   Problem Relation Age of Onset    No Known Problems Mother     Heart attack Father     Heart disease Father     No Known Problems Sister     No Known Problems Maternal Grandmother     No Known Problems Maternal Grandfather      No Known Problems Paternal Grandmother     No Known Problems Paternal Grandfather     No Known Problems Son     Breast cancer Maternal Aunt 93    BRCA2 Positive Neg Hx     BRCA1 Positive Neg Hx     BRCA2 Negative Neg Hx     BRCA1 Negative Neg Hx     BRCA 1/2 Neg Hx     Ovarian cancer Neg Hx     Endometrial cancer Neg Hx     Colon cancer Neg Hx     Breast cancer additional onset Neg Hx      I have reviewed and agree with the history as documented.    E-Cigarette/Vaping    E-Cigarette Use Never User      E-Cigarette/Vaping Substances    Nicotine No     THC No     CBD No     Flavoring No     Other No     Unknown No      Social History     Tobacco Use    Smoking status: Never    Smokeless tobacco: Never   Vaping Use    Vaping status: Never Used   Substance Use Topics    Alcohol use: No    Drug use: Never       Review of Systems   Constitutional:  Negative for fever.   HENT:  Negative for congestion.    Eyes:  Negative for pain and redness.   Respiratory:  Negative for cough and shortness of breath.    Cardiovascular:  Negative for chest pain.   Gastrointestinal:  Positive for abdominal pain. Negative for vomiting.       Physical Exam  Physical Exam  Vitals and nursing note reviewed.   Constitutional:       Appearance: She is well-developed.   HENT:      Head: Normocephalic.      Right Ear: External ear normal.      Left Ear: External ear normal.      Nose: Nose normal.      Mouth/Throat:      Mouth: Mucous membranes are moist.      Pharynx: Oropharynx is clear.   Eyes:      General: Lids are normal.      Extraocular Movements: Extraocular movements intact.      Pupils: Pupils are equal, round, and reactive to light.   Cardiovascular:      Rate and Rhythm: Normal rate and regular rhythm.      Pulses: Normal pulses.      Heart sounds: Normal heart sounds.   Pulmonary:      Effort: Pulmonary effort is normal. No respiratory distress.   Abdominal:      General: Abdomen is flat. Bowel sounds are normal.      Tenderness:  There is abdominal tenderness.      Comments: Tender right lower quadrant without rebound or guarding   Musculoskeletal:         General: No deformity. Normal range of motion.      Cervical back: Normal range of motion and neck supple.   Skin:     General: Skin is warm and dry.   Neurological:      Mental Status: She is alert and oriented to person, place, and time.   Psychiatric:         Mood and Affect: Mood normal.         Vital Signs  ED Triage Vitals   Temperature Pulse Respirations Blood Pressure SpO2   03/01/24 1723 03/01/24 1723 03/01/24 1723 03/01/24 1723 03/01/24 1723   97.8 °F (36.6 °C) 81 16 109/62 99 %      Temp Source Heart Rate Source Patient Position - Orthostatic VS BP Location FiO2 (%)   03/01/24 1723 03/01/24 1723 03/01/24 1900 03/01/24 1723 --   Temporal Monitor Sitting Left arm       Pain Score       03/01/24 1743       5           Vitals:    03/01/24 1723 03/01/24 1800 03/01/24 1900 03/01/24 1937   BP: 109/62 114/64 114/66 110/61   Pulse: 81 81 84 81   Patient Position - Orthostatic VS:   Sitting          Visual Acuity      ED Medications  Medications   oxyCODONE-acetaminophen (PERCOCET) 5-325 mg per tablet 1 tablet (1 tablet Oral Given 3/1/24 1743)   iohexol (OMNIPAQUE) 350 MG/ML injection (MULTI-DOSE) 100 mL (100 mL Intravenous Given 3/1/24 1826)       Diagnostic Studies  Results Reviewed       Procedure Component Value Units Date/Time    UA w Reflex to Microscopic w Reflex to Culture [[id number]] Collected: 03/01/24 1749    Lab Status: In process Specimen: Urine, Clean Catch Updated: 03/01/24 1942    hCG, qualitative pregnancy [[id number]]  (Normal) Collected: 03/01/24 1743    Lab Status: Final result Specimen: Blood from Arm, Left Updated: 03/01/24 1813     Preg, Serum Negative    Basic metabolic panel [[id number]]  (Abnormal) Collected: 03/01/24 1743    Lab Status: Final result Specimen: Blood from Arm, Left Updated: 03/01/24 1809     Sodium 138 mmol/L      Potassium 3.4 mmol/L      Chloride  105 mmol/L      CO2 24 mmol/L      ANION GAP 9 mmol/L      BUN 12 mg/dL      Creatinine 0.47 mg/dL      Glucose 105 mg/dL      Calcium 10.0 mg/dL      eGFR 122 ml/min/1.73sq m     Narrative:      National Kid[employer] Foundation guidelines for Chronic Kidney Disease (CKD):     Stage 1 with normal or high GFR (GFR > 90 mL/min/1.73 square meters)    Stage 2 Mild CKD (GFR = 60-89 mL/min/1.73 square meters)    Stage 3A Moderate CKD (GFR = 45-59 mL/min/1.73 square meters)    Stage 3B Moderate CKD (GFR = 30-44 mL/min/1.73 square meters)    Stage 4 Severe CKD (GFR = 15-29 mL/min/1.73 square meters)    Stage 5 End Stage CKD (GFR <15 mL/min/1.73 square meters)  Note: GFR calculation is accurate only with a steady state creatinine    Hepatic function panel [[id number]]  (Normal) Collected: 03/01/24 1743    Lab Status: Final result Specimen: Blood from Arm, Left Updated: 03/01/24 1809     Total Bilirubin 0.50 mg/dL      Bilirubin, Direct 0.07 mg/dL      Alkaline Phosphatase 68 U/L      AST 16 U/L      ALT 11 U/L      Total Protein 7.7 g/dL      Albumin 4.6 g/dL     CBC and differential [[id number]]  (Abnormal) Collected: 03/01/24 1743    Lab Status: Final result Specimen: Blood from Arm, Left Updated: 03/01/24 1751     WBC 8.58 Thousand/uL      RBC 4.53 Million/uL      Hemoglobin 11.9 g/dL      Hematocrit 37.3 %      MCV 82 fL      MCH 26.3 pg      MCHC 31.9 g/dL      RDW 16.4 %      MPV 9.9 fL      Platelets 286 Thousands/uL      nRBC 0 /100 WBCs      Neutrophils Relative 59 %      Immat GRANS % 0 %      Lymphocytes Relative 30 %      Monocytes Relative 8 %      Eosinophils Relative 2 %      Basophils Relative 1 %      Neutrophils Absolute 5.11 Thousands/µL      Immature Grans Absolute 0.03 Thousand/uL      Lymphocytes Absolute 2.57 Thousands/µL      Monocytes Absolute 0.66 Thousand/µL      Eosinophils Absolute 0.15 Thousand/µL      Basophils Absolute 0.06 Thousands/µL                    CT abdomen pelvis with contrast    Final Result by Lyndon Colindres DO (03/01 1923)   No CT evidence for appendicitis. The amount of formed stool in the colon is within normal limits. Could consider mild constipation.   2 liver lesions identified both of which are likely large hemangiomas. Recommend follow-up nonemergent liver MRI to confirm.   Enlarged bulky myomatous uterus.      Workstation performed: [id number]                    Procedures  Procedures         ED Course       Diagnostic testing:  Pregnancy test was negative    Electrolytes are within normal limits  Liver functions were normal no sign of hepatitis or hepatic inflammation  White count was normal at 8.5 no sign of inflammation  Hemoglobin is normal 11.9 no sign of anemia    CT scan of the abdomen pelvis showed liver lesions probably hemangiomas, uterine fibroids, discussed with the patient and her , reviewed the CAT scan.  Discussed need for follow-up for possible MRI discussed the need for follow-up with gynecology                                      Medical Decision Making  Medical decision making 43-year-old female presents emergency department with right lower quadrant abdominal pain.  History of fibroids.  Apparently was planned to have surgery earlier this month but canceled due to an issue with her surgeon.  Patient reports now worsening pain over the last 2 days and feels a fullness in her right lower abdomen.  There was concern for appendicitis or other pathology so CT was performed and showed lesions consistent with hemangioma.  Discussed with patient and  advised MRI.  Discussed with them also uterine fibroids we discussed follow-up with gynecology for possible excision.  We discussed analgesics.  Patient reports pain relief with Percocet today.  We discussed using Percocet in the future as needed.  We discussed gynecology follow-up we discussed indications to return.    Amount and/or Complexity of Data Reviewed  Labs: ordered.  Radiology:  ordered.    Risk  Prescription drug management.             Disposition  Final diagnoses:   Abdominal pain   Fibroid, uterine   Liver lesion     Time reflects when diagnosis was documented in both MDM as applicable and the Disposition within this note       Time User Action Codes Description Comment    3/1/2024  7:32 PM Brad Ha [R10.9] Abdominal pain     3/1/2024  7:33 PM Brad Ha [D25.9] Fibroid, uterine     3/1/2024  7:33 PM Brad Ha [K76.9] Liver lesion           ED Disposition       ED Disposition   Discharge    Condition   Stable    Date/Time   Fri Mar 1, 2024  7:32 PM    Comment   Kacie Gomes discharge to home/self care.                   Follow-up Information       Follow up With Specialties Details Why Contact Info    Katlyn Conklin DO Family Medicine   [facility]  [facility] [address] [phone]              Discharge Medication List as of 3/1/2024  7:35 PM        START taking these medications    Details   oxyCODONE-acetaminophen (PERCOCET) 5-325 mg per tablet Take 1 tablet by mouth every 6 (six) hours as needed for severe pain or moderate pain for up to 20 doses Max Daily Amount: 4 tablets, Starting Fri 3/1/2024, Normal           CONTINUE these medications which have NOT CHANGED    Details   ascorbic acid (VITAMIN C) 1000 MG tablet Take by mouth as needed, Historical Med             No discharge procedures on file.    PDMP Review         Value Time User    PDMP Reviewed  Yes 9/20/2021 12:36 PM Kali Gonzalez PA-C            ED Provider  Electronically Signed by             Brad Ha MD  03/02/24 2839

## 2024-03-02 NOTE — DISCHARGE INSTRUCTIONS
Tylenol or Motrin for milder pain  Can continue Percocet 1 every 6 hours if needed for more severe pain caution narcotic will make you sleepy  Follow-up with your gynecologist about your fibroids, may want to discuss surgery  Follow-up with your family doctor about the liver lesion may require MRI  Return increasing pain worsening symptoms or any problems

## 2024-03-14 NOTE — TELEPHONE ENCOUNTER
Patients  was returning vm he received in regards to scheduling an appt for patient, writer transferred caller to intake for assistance You can access the FollowMyHealth Patient Portal offered by [facility] by registering at the following website: [email or web address]. By joining [facility]’s FollowMyHealth portal, you will also be able to view your health information using other applications (apps) compatible with our system.

## 2024-05-31 ENCOUNTER — VBI (OUTPATIENT)
Dept: ADMINISTRATIVE | Facility: OTHER | Age: 44
End: 2024-05-31

## 2024-11-14 ENCOUNTER — VBI (OUTPATIENT)
Dept: ADMINISTRATIVE | Facility: OTHER | Age: 44
End: 2024-11-14

## 2024-11-14 NOTE — TELEPHONE ENCOUNTER
11/14/24 2:12 PM     Chart reviewed for Blood Pressure was/were submitted to the patient's insurance.     Cande Mobley   [facility]

## 2024-12-12 ENCOUNTER — HOSPITAL ENCOUNTER (EMERGENCY)
Facility: HOSPITAL | Age: 44
Discharge: HOME | End: 2024-12-12
Attending: EMERGENCY MEDICINE
Payer: COMMERCIAL

## 2024-12-12 VITALS
RESPIRATION RATE: 20 BRPM | DIASTOLIC BLOOD PRESSURE: 68 MMHG | SYSTOLIC BLOOD PRESSURE: 93 MMHG | HEART RATE: 111 BPM | TEMPERATURE: 100.2 F | WEIGHT: 166 LBS | BODY MASS INDEX: 27.62 KG/M2 | OXYGEN SATURATION: 98 %

## 2024-12-12 DIAGNOSIS — J11.1 INFLUENZA: Primary | ICD-10-CM

## 2024-12-12 LAB
FLUAV AG UPPER RESP QL IA.RAPID: POSITIVE
FLUBV AG UPPER RESP QL IA.RAPID: NEGATIVE
SARS-COV+SARS-COV-2 AG RESP QL IA.RAPID: NEGATIVE

## 2024-12-12 PROCEDURE — 87811 SARS-COV-2 COVID19 W/OPTIC: CPT | Performed by: EMERGENCY MEDICINE

## 2024-12-12 PROCEDURE — 87804 INFLUENZA ASSAY W/OPTIC: CPT | Performed by: EMERGENCY MEDICINE

## 2024-12-12 PROCEDURE — 99283 EMERGENCY DEPT VISIT LOW MDM: CPT

## 2024-12-12 PROCEDURE — 99284 EMERGENCY DEPT VISIT MOD MDM: CPT | Performed by: EMERGENCY MEDICINE

## 2024-12-12 RX ORDER — HYDROCODONE BITARTRATE AND ACETAMINOPHEN 5; 325 MG/1; MG/1
1 TABLET ORAL ONCE
Refills: 0 | Status: COMPLETED | OUTPATIENT
Start: 2024-12-12 | End: 2024-12-12

## 2024-12-12 RX ORDER — PREDNISONE 50 MG/1
50 TABLET ORAL DAILY
Qty: 4 TABLET | Refills: 0 | Status: SHIPPED | OUTPATIENT
Start: 2024-12-13

## 2024-12-12 RX ORDER — OSELTAMIVIR PHOSPHATE 75 MG/1
75 CAPSULE ORAL EVERY 12 HOURS
Qty: 10 CAPSULE | Refills: 0 | Status: SHIPPED | OUTPATIENT
Start: 2024-12-12 | End: 2024-12-17

## 2024-12-12 RX ADMIN — PREDNISONE 50 MG: 20 TABLET ORAL at 16:32

## 2024-12-12 RX ADMIN — HYDROCODONE BITARTRATE AND ACETAMINOPHEN 1 TABLET: 5; 325 TABLET ORAL at 16:32

## 2024-12-12 NOTE — Clinical Note
Kacie Gomes was seen and treated in our emergency department on 12/12/2024.                Diagnosis: INfluenza    Kacie  may return to work on return date.    She may return on this date: 12/17/2024         If you have any questions or concerns, please don't hesitate to call.      Eliezer Coker MD    ______________________________           _______________          _______________  Hospital Representative                              Date                                Time

## 2024-12-12 NOTE — DISCHARGE INSTRUCTIONS
A  personal message from Dr. Eliezer Coker,  Thank you so much for allowing me to care for you today.    I pride myself in the care and attention I give all my patients.  I hope you were a witness to this tonight.   If for any reason your condition does not improve or worsens, or you have a question that was not answered during your visit you can feel free to text me on my personal phone #  # [phone].   I will answer to your message and continue your care past your emergency room visit.     Please understand that although you are being discharged because your condition has been deemed stable and able to be managed on an outpatient setting. However your condition may worsen as part of the natural progression of the illness/condition, if this occurs please come back to the emergency department for a repeat evaluation.

## 2024-12-17 NOTE — ED PROVIDER NOTES
Time reflects when diagnosis was documented in both MDM as applicable and the Disposition within this note       Time User Action Codes Description Comment    12/12/2024  4:58 PM Eliezer Coker Add [J11.1] Influenza           ED Disposition       ED Disposition   Discharge    Condition   Stable    Date/Time   Thu Dec 12, 2024  4:58 PM    Comment   Kacie Gomes discharge to home/self care.                   Assessment & Plan       Medical Decision Making  Problems Addressed:  Influenza: acute illness or injury    Amount and/or Complexity of Data Reviewed  Labs: ordered. Decision-making details documented in ED Course.  Discussion of management or test interpretation with external provider(s): Patient clinical presentation is benign.    Meaning patient's vital signs are normal and stable ED Triage Vitals  Temperature: 100.2 °F (37.9 °C) [12/12/24 1550]  Pulse: (!) 111 [12/12/24 1550]  Respirations: 20 [12/12/24 1550]  Blood Pressure: 93/68 [12/12/24 1550]  SpO2: 98 % [12/12/24 1550]  Temp Source: Oral [12/12/24 1550]  Heart Rate Source: Monitor [12/12/24 1550]  Patient Position - Orthostatic VS: Sitting [12/12/24 1550]  BP Location: Left arm [12/12/24 1550]  FiO2 (%): n/a  Pain Score: 10 - Worst Possible Pain [12/12/24 1632].    Patient in no distress.    Chief complaint, vital signs, physical examination does not suggest an acute medical emergency at this time.       Risk  OTC drugs.  Prescription drug management.        ED Course as of 12/16/24 2352   Thu Dec 12, 2024   1658 Influenza A Rapid Antigen(!): Positive       Medications   HYDROcodone-acetaminophen (NORCO) 5-325 mg per tablet 1 tablet (1 tablet Oral Given 12/12/24 1632)   predniSONE tablet 50 mg (50 mg Oral Given 12/12/24 1632)       ED Risk Strat Scores                          SBIRT 20yo+      Flowsheet Row Most Recent Value   Initial Alcohol Screen: US AUDIT-C     1. How often do you have a drink containing alcohol? 0 Filed at: 12/12/2024 1551   2. How  many drinks containing alcohol do you have on a typical day you are drinking?  0 Filed at: 2024   3b. FEMALE Any Age, or MALE 65+: How often do you have 4 or more drinks on one occassion? 0 Filed at: 2024   Audit-C Score 0 Filed at: 2024   BOBBY: How many times in the past year have you...    Used an illegal drug or used a prescription medication for non-medical reasons? Never Filed at: 2024                            History of Present Illness       Chief Complaint   Patient presents with    Flu Symptoms     Pt states she has the flu, + fever x 24 hrs. Taking tylenol        Past Medical History:   Diagnosis Date    GERD (gastroesophageal reflux disease)     Sciatica of left side       Past Surgical History:   Procedure Laterality Date    BREAST LUMPECTOMY Right 2021    Procedure: BREAST JIMBO  DIRECTED LUMPECTOMY;  Surgeon: Dani Moore MD;  Location: [facility];  Service: Surgical Oncology     SECTION      EGD      US BREAST CLIP NEEDLE LOC RIGHT Right 2021    US GUIDED BREAST BIOPSY RIGHT COMPLETE Right 2021      Family History   Problem Relation Age of Onset    No Known Problems Mother     Heart attack Father     Heart disease Father     No Known Problems Sister     No Known Problems Maternal Grandmother     No Known Problems Maternal Grandfather     No Known Problems Paternal Grandmother     No Known Problems Paternal Grandfather     No Known Problems Son     Breast cancer Maternal Aunt 93    BRCA2 Positive Neg Hx     BRCA1 Positive Neg Hx     BRCA2 Negative Neg Hx     BRCA1 Negative Neg Hx     BRCA 1/2 Neg Hx     Ovarian cancer Neg Hx     Endometrial cancer Neg Hx     Colon cancer Neg Hx     Breast cancer additional onset Neg Hx       Social History     Tobacco Use    Smoking status: Never    Smokeless tobacco: Never   Vaping Use    Vaping status: Never Used   Substance Use Topics    Alcohol use: No    Drug use: Never       E-Cigarette/Vaping    E-Cigarette Use Never User       E-Cigarette/Vaping Substances    Nicotine No     THC No     CBD No     Flavoring No     Other No     Unknown No       I have reviewed and agree with the history as documented.     Kacie Gomes is a 44 y.o.  year old female  Past Medical History:  No date: GERD (gastroesophageal reflux disease)  No date: Sciatica of left side  Social History    Tobacco Use      Smoking status: Never      Smokeless tobacco: Never    Vaping Use      Vaping status: Never Used    Alcohol use: No    Drug use: Never    Patient presents with:  Flu Symptoms: Pt states she has the flu, + fever x 24 hrs. Taking tylenol                     History provided by:  Patient   used: No    Flu Symptoms  Presenting symptoms: fatigue, headache and myalgias    Presenting symptoms: no cough, no fever, no shortness of breath, no sore throat and no vomiting    Associated symptoms: no chills and no ear pain        Review of Systems   Constitutional:  Positive for fatigue. Negative for chills and fever.   HENT:  Negative for ear pain and sore throat.    Eyes:  Negative for pain and visual disturbance.   Respiratory:  Negative for cough and shortness of breath.    Cardiovascular:  Negative for chest pain and palpitations.   Gastrointestinal:  Negative for abdominal pain and vomiting.   Genitourinary:  Negative for dysuria and hematuria.   Musculoskeletal:  Positive for myalgias. Negative for arthralgias and back pain.   Skin:  Negative for color change and rash.   Neurological:  Positive for headaches. Negative for seizures and syncope.   All other systems reviewed and are negative.          Objective       ED Triage Vitals   Temperature Pulse Blood Pressure Respirations SpO2 Patient Position - Orthostatic VS   12/12/24 1550 12/12/24 1550 12/12/24 1550 12/12/24 1550 12/12/24 1550 12/12/24 1550   100.2 °F (37.9 °C) (!) 111 93/68 20 98 % Sitting      Temp Source Heart Rate Source BP  Location FiO2 (%) Pain Score    12/12/24 1550 12/12/24 1550 12/12/24 1550 -- 12/12/24 1632    Oral Monitor Left arm  10 - Worst Possible Pain      Vitals      Date and Time Temp Pulse SpO2 Resp BP Pain Score FACES Pain Rating User   12/12/24 1632 -- -- -- -- -- 10 - Worst Possible Pain -- MB   12/12/24 1550 100.2 °F (37.9 °C) 111 98 % 20 93/68 -- -- AM            Physical Exam  Vitals and nursing note reviewed.   Constitutional:       General: She is not in acute distress.     Appearance: Normal appearance. She is well-developed.   HENT:      Head: Normocephalic and atraumatic.   Eyes:      Conjunctiva/sclera: Conjunctivae normal.   Cardiovascular:      Rate and Rhythm: Normal rate and regular rhythm.      Heart sounds: No murmur heard.  Pulmonary:      Effort: Pulmonary effort is normal. No respiratory distress.      Breath sounds: Normal breath sounds.   Abdominal:      Palpations: Abdomen is soft.      Tenderness: There is no abdominal tenderness.   Musculoskeletal:         General: No swelling.      Cervical back: Neck supple.   Skin:     General: Skin is warm and dry.      Capillary Refill: Capillary refill takes less than 2 seconds.   Neurological:      General: No focal deficit present.      Mental Status: She is alert and oriented to person, place, and time.   Psychiatric:         Mood and Affect: Mood normal.         Thought Content: Thought content normal.         Results Reviewed       Procedure Component Value Units Date/Time    FLU/COVID Rapid Antigen (30 min. TAT) - Preferred screening test in ED [[id number]]  (Abnormal) Collected: 12/12/24 1632    Lab Status: Final result Specimen: Nares from Nose Updated: 12/12/24 1656     SARS COV Rapid Antigen Negative     Influenza A Rapid Antigen Positive     Influenza B Rapid Antigen Negative    Narrative:      This test has been performed using the [employer] Fabiana 2 FLU+SARS Antigen test under the Emergency Use Authorization (EUA). This test has been validated by  the  and verified by the performing laboratory. The Fabiana uses lateral flow immunofluorescent sandwich assay to detect SARS-COV, Influenza A and Influenza B Antigen.     The Quidel Fabiana 2 SARS Antigen test does not differentiate between SARS-CoV and SARS-CoV-2.     Negative results are presumptive and may be confirmed with a molecular assay, if necessary, for patient management. Negative results do not rule out SARS-CoV-2 or influenza infection and should not be used as the sole basis for treatment or patient management decisions. A negative test result may occur if the level of antigen in a sample is below the limit of detection of this test.     Positive results are indicative of the presence of viral antigens, but do not rule out bacterial infection or co-infection with other viruses.     All test results should be used as an adjunct to clinical observations and other information available to the provider.    FOR PEDIATRIC PATIENTS - copy/paste COVID Guidelines URL to browser: [email or web address]            No orders to display       Procedures    ED Medication and Procedure Management   Prior to Admission Medications   Prescriptions Last Dose Informant Patient Reported? Taking?   ascorbic acid (VITAMIN C) 1000 MG tablet  Self Yes No   Sig: Take by mouth as needed   oxyCODONE-acetaminophen (PERCOCET) 5-325 mg per tablet   No No   Sig: Take 1 tablet by mouth every 6 (six) hours as needed for severe pain or moderate pain for up to 20 doses Max Daily Amount: 4 tablets      Facility-Administered Medications: None     Discharge Medication List as of 12/12/2024  4:59 PM        START taking these medications    Details   oseltamivir (TAMIFLU) 75 mg capsule Take 1 capsule (75 mg total) by mouth every 12 (twelve) hours for 5 days, Starting Thu 12/12/2024, Until Tue 12/17/2024, Normal      predniSONE 50 mg tablet Take 1 tablet (50 mg total) by mouth daily  Do not start before December 13, 2024., Starting Fri 12/13/2024, Normal           CONTINUE these medications which have NOT CHANGED    Details   ascorbic acid (VITAMIN C) 1000 MG tablet Take by mouth as needed, Historical Med      oxyCODONE-acetaminophen (PERCOCET) 5-325 mg per tablet Take 1 tablet by mouth every 6 (six) hours as needed for severe pain or moderate pain for up to 20 doses Max Daily Amount: 4 tablets, Starting Fri 3/1/2024, Normal           No discharge procedures on file.  ED SEPSIS DOCUMENTATION   Time reflects when diagnosis was documented in both MDM as applicable and the Disposition within this note       Time User Action Codes Description Comment    12/12/2024  4:58 PM Eliezer Coker Add [J11.1] Influenza                  Eliezer Coker MD  12/16/24 3554

## 2024-12-27 ENCOUNTER — APPOINTMENT (EMERGENCY)
Dept: RADIOLOGY | Facility: HOSPITAL | Age: 44
End: 2024-12-27
Payer: COMMERCIAL

## 2024-12-27 ENCOUNTER — HOSPITAL ENCOUNTER (EMERGENCY)
Facility: HOSPITAL | Age: 44
Discharge: HOME | End: 2024-12-27
Attending: EMERGENCY MEDICINE
Payer: COMMERCIAL

## 2024-12-27 VITALS
SYSTOLIC BLOOD PRESSURE: 116 MMHG | HEIGHT: 65 IN | HEART RATE: 91 BPM | BODY MASS INDEX: 26.66 KG/M2 | DIASTOLIC BLOOD PRESSURE: 72 MMHG | RESPIRATION RATE: 18 BRPM | OXYGEN SATURATION: 98 % | WEIGHT: 160 LBS | TEMPERATURE: 98.1 F

## 2024-12-27 DIAGNOSIS — S93.409A ANKLE SPRAIN: Primary | ICD-10-CM

## 2024-12-27 PROCEDURE — 99284 EMERGENCY DEPT VISIT MOD MDM: CPT | Performed by: EMERGENCY MEDICINE

## 2024-12-27 PROCEDURE — 73630 X-RAY EXAM OF FOOT: CPT

## 2024-12-27 PROCEDURE — 99283 EMERGENCY DEPT VISIT LOW MDM: CPT

## 2024-12-27 PROCEDURE — 96372 THER/PROPH/DIAG INJ SC/IM: CPT

## 2024-12-27 PROCEDURE — 73610 X-RAY EXAM OF ANKLE: CPT

## 2024-12-27 RX ORDER — KETOROLAC TROMETHAMINE 30 MG/ML
15 INJECTION, SOLUTION INTRAMUSCULAR; INTRAVENOUS ONCE
Status: COMPLETED | OUTPATIENT
Start: 2024-12-27 | End: 2024-12-27

## 2024-12-27 RX ORDER — ACETAMINOPHEN 325 MG/1
975 TABLET ORAL ONCE
Status: COMPLETED | OUTPATIENT
Start: 2024-12-27 | End: 2024-12-27

## 2024-12-27 RX ADMIN — KETOROLAC TROMETHAMINE 15 MG: 30 INJECTION, SOLUTION INTRAMUSCULAR at 19:34

## 2024-12-27 RX ADMIN — ACETAMINOPHEN 975 MG: 325 TABLET ORAL at 19:34

## 2024-12-28 NOTE — ED PROVIDER NOTES
"  ED Disposition       None          Assessment & Plan       Medical Decision Making  Patient is a 44-year-old female past medical history GERD presenting with foot injury.  Patient is well-appearing at bedside with stable vitals and in no acute distress.  She has tenderness about the midfoot with mild ecchymosis and moderate edema to the lateral malleolus where she is tender.  She is neurovascularly intact.  Will give pain control, obtain x-rays to rule out fracture and reassess.    Amount and/or Complexity of Data Reviewed  Radiology: ordered.    Risk  OTC drugs.  Prescription drug management.        ED Course as of 12/27/24 2102   Fri Dec 27, 2024   1922 Workup unremarkable, will place in walking boot with crutches and outpatient follow-up.       Medications   acetaminophen (TYLENOL) tablet 975 mg (has no administration in time range)   ketorolac (TORADOL) injection 15 mg (has no administration in time range)       ED Risk Strat Scores                          SBIRT 20yo+      Flowsheet Row Most Recent Value   Initial Alcohol Screen: US AUDIT-C     1. How often do you have a drink containing alcohol? 0 Filed at: 12/27/2024 1839   2. How many drinks containing alcohol do you have on a typical day you are drinking?  0 Filed at: 12/27/2024 1839   3b. FEMALE Any Age, or MALE 65+: How often do you have 4 or more drinks on one occassion? 0 Filed at: 12/27/2024 1839   Audit-C Score 0 Filed at: 12/27/2024 1839   BOBBY: How many times in the past year have you...    Used an illegal drug or used a prescription medication for non-medical reasons? Never Filed at: 12/27/2024 1839                            History of Present Illness       Chief Complaint   Patient presents with   • Foot Injury     Pt reports \"slipping at home while cleaning and injuring L foot. C/o Ankle and foot pain.\"       Past Medical History:   Diagnosis Date   • GERD (gastroesophageal reflux disease)    • Sciatica of left side       Past Surgical " History:   Procedure Laterality Date   • BREAST LUMPECTOMY Right 2021    Procedure: BREAST JIMBO  DIRECTED LUMPECTOMY;  Surgeon: Dani Moore MD;  Location: [facility];  Service: Surgical Oncology   •  SECTION     • EGD     • US BREAST CLIP NEEDLE LOC RIGHT Right 2021   • US GUIDED BREAST BIOPSY RIGHT COMPLETE Right 2021      Family History   Problem Relation Age of Onset   • No Known Problems Mother    • Heart attack Father    • Heart disease Father    • No Known Problems Sister    • No Known Problems Maternal Grandmother    • No Known Problems Maternal Grandfather    • No Known Problems Paternal Grandmother    • No Known Problems Paternal Grandfather    • No Known Problems Son    • Breast cancer Maternal Aunt 93   • BRCA2 Positive Neg Hx    • BRCA1 Positive Neg Hx    • BRCA2 Negative Neg Hx    • BRCA1 Negative Neg Hx    • BRCA 1/2 Neg Hx    • Ovarian cancer Neg Hx    • Endometrial cancer Neg Hx    • Colon cancer Neg Hx    • Breast cancer additional onset Neg Hx       Social History     Tobacco Use   • Smoking status: Never   • Smokeless tobacco: Never   Vaping Use   • Vaping status: Never Used   Substance Use Topics   • Alcohol use: No   • Drug use: Never      E-Cigarette/Vaping   • E-Cigarette Use Never User       E-Cigarette/Vaping Substances   • Nicotine No    • THC No    • CBD No    • Flavoring No    • Other No    • Unknown No       I have reviewed and agree with the history as documented.     Patient is a 44-year-old female past medical history GERD presenting with left foot injury.  Patient states that she inverted her left ankle today and cannot bear weight.  She denies any other injuries.  She notes lateral and diffuse midfoot pain radiating into her ankle since that time denies any numbness or tingling, has not take any medication for it.        Review of Systems   All other systems reviewed and are negative.          Objective       ED Triage Vitals [24 1838]    Temperature Pulse Blood Pressure Respirations SpO2 Patient Position - Orthostatic VS   98.1 °F (36.7 °C) 91 116/72 18 98 % Sitting      Temp src Heart Rate Source BP Location FiO2 (%) Pain Score    -- Monitor -- -- --      Vitals      Date and Time Temp Pulse SpO2 Resp BP Pain Score FACES Pain Rating User   12/27/24 1838 98.1 °F (36.7 °C) 91 98 % 18 116/72 -- -- JT            Physical Exam  Vitals reviewed.   Constitutional:       General: She is not in acute distress.     Appearance: Normal appearance. She is not ill-appearing.   HENT:      Mouth/Throat:      Mouth: Mucous membranes are moist.   Eyes:      Conjunctiva/sclera: Conjunctivae normal.   Cardiovascular:      Rate and Rhythm: Normal rate.      Pulses: Normal pulses.   Pulmonary:      Effort: Pulmonary effort is normal.   Abdominal:      Palpations: Abdomen is soft.   Musculoskeletal:         General: Swelling and tenderness present. Normal range of motion.      Cervical back: Neck supple.      Comments: Tenderness most so to the midfoot with mild ecchymosis over the dorsum of the midfoot with moderate edema to the lateral malleolus and tenderness in this area intact motor, sensation, pulses   Skin:     General: Skin is warm and dry.      Capillary Refill: Capillary refill takes less than 2 seconds.   Neurological:      General: No focal deficit present.      Mental Status: She is alert.      Motor: No weakness.   Psychiatric:         Mood and Affect: Mood normal.         Results Reviewed       None            XR foot 3+ views LEFT    (Results Pending)   XR ankle 3+ views LEFT    (Results Pending)       Procedures    ED Medication and Procedure Management   Prior to Admission Medications   Prescriptions Last Dose Informant Patient Reported? Taking?   ascorbic acid (VITAMIN C) 1000 MG tablet  Self Yes No   Sig: Take by mouth as needed   oxyCODONE-acetaminophen (PERCOCET) 5-325 mg per tablet   No No   Sig: Take 1 tablet by mouth every 6 (six) hours as  needed for severe pain or moderate pain for up to 20 doses Max Daily Amount: 4 tablets   predniSONE 50 mg tablet   No No   Sig: Take 1 tablet (50 mg total) by mouth daily Do not start before December 13, 2024.      Facility-Administered Medications: None     Patient's Medications   Discharge Prescriptions    No medications on file     No discharge procedures on file.  ED SEPSIS DOCUMENTATION            Cheyenne Hameed,   12/27/24 0490

## 2025-01-21 ENCOUNTER — APPOINTMENT (EMERGENCY)
Dept: RADIOLOGY | Facility: HOSPITAL | Age: 45
End: 2025-01-21
Payer: COMMERCIAL

## 2025-01-21 ENCOUNTER — HOSPITAL ENCOUNTER (EMERGENCY)
Facility: HOSPITAL | Age: 45
Discharge: HOME | End: 2025-01-21
Attending: EMERGENCY MEDICINE | Admitting: EMERGENCY MEDICINE
Payer: COMMERCIAL

## 2025-01-21 VITALS
HEART RATE: 67 BPM | OXYGEN SATURATION: 100 % | BODY MASS INDEX: 23.93 KG/M2 | SYSTOLIC BLOOD PRESSURE: 99 MMHG | RESPIRATION RATE: 18 BRPM | DIASTOLIC BLOOD PRESSURE: 51 MMHG | HEIGHT: 69 IN | WEIGHT: 161.6 LBS | TEMPERATURE: 98 F

## 2025-01-21 DIAGNOSIS — R10.9 ABDOMINAL PAIN: Primary | ICD-10-CM

## 2025-01-21 DIAGNOSIS — K21.9 GERD (GASTROESOPHAGEAL REFLUX DISEASE): ICD-10-CM

## 2025-01-21 LAB
ALBUMIN SERPL BCG-MCNC: 4.4 G/DL (ref 3.5–5)
ALP SERPL-CCNC: 65 U/L (ref 34–104)
ALT SERPL W P-5'-P-CCNC: 13 U/L (ref 7–52)
ANION GAP SERPL CALCULATED.3IONS-SCNC: 8 MMOL/L (ref 4–13)
AST SERPL W P-5'-P-CCNC: 17 U/L (ref 13–39)
BASOPHILS # BLD AUTO: 0.06 THOUSANDS/ΜL (ref 0–0.1)
BASOPHILS NFR BLD AUTO: 1 % (ref 0–1)
BILIRUB SERPL-MCNC: 0.59 MG/DL (ref 0.2–1)
BUN SERPL-MCNC: 9 MG/DL (ref 5–25)
CALCIUM SERPL-MCNC: 9.9 MG/DL (ref 8.4–10.2)
CARDIAC TROPONIN I PNL SERPL HS: <2 NG/L (ref ?–50)
CHLORIDE SERPL-SCNC: 105 MMOL/L (ref 96–108)
CO2 SERPL-SCNC: 24 MMOL/L (ref 21–32)
CREAT SERPL-MCNC: 0.45 MG/DL (ref 0.6–1.3)
EOSINOPHIL # BLD AUTO: 0.1 THOUSAND/ΜL (ref 0–0.61)
EOSINOPHIL NFR BLD AUTO: 1 % (ref 0–6)
ERYTHROCYTE [DISTWIDTH] IN BLOOD BY AUTOMATED COUNT: 13 % (ref 11.6–15.1)
GFR SERPL CREATININE-BSD FRML MDRD: 123 ML/MIN/1.73SQ M
GLUCOSE SERPL-MCNC: 87 MG/DL (ref 65–140)
HCG SERPL QL: NEGATIVE
HCT VFR BLD AUTO: 38.5 % (ref 34.8–46.1)
HGB BLD-MCNC: 12.7 G/DL (ref 11.5–15.4)
IMM GRANULOCYTES # BLD AUTO: 0.03 THOUSAND/UL (ref 0–0.2)
IMM GRANULOCYTES NFR BLD AUTO: 0 % (ref 0–2)
LIPASE SERPL-CCNC: 27 U/L (ref 11–82)
LYMPHOCYTES # BLD AUTO: 3 THOUSANDS/ΜL (ref 0.6–4.47)
LYMPHOCYTES NFR BLD AUTO: 29 % (ref 14–44)
MCH RBC QN AUTO: 29 PG (ref 26.8–34.3)
MCHC RBC AUTO-ENTMCNC: 33 G/DL (ref 31.4–37.4)
MCV RBC AUTO: 88 FL (ref 82–98)
MONOCYTES # BLD AUTO: 0.81 THOUSAND/ΜL (ref 0.17–1.22)
MONOCYTES NFR BLD AUTO: 8 % (ref 4–12)
NEUTROPHILS # BLD AUTO: 6.26 THOUSANDS/ΜL (ref 1.85–7.62)
NEUTS SEG NFR BLD AUTO: 61 % (ref 43–75)
NRBC BLD AUTO-RTO: 0 /100 WBCS
PLATELET # BLD AUTO: 310 THOUSANDS/UL (ref 149–390)
PMV BLD AUTO: 10 FL (ref 8.9–12.7)
POTASSIUM SERPL-SCNC: 3.6 MMOL/L (ref 3.5–5.3)
PROT SERPL-MCNC: 7.5 G/DL (ref 6.4–8.4)
RBC # BLD AUTO: 4.38 MILLION/UL (ref 3.81–5.12)
SODIUM SERPL-SCNC: 137 MMOL/L (ref 135–147)
WBC # BLD AUTO: 10.26 THOUSAND/UL (ref 4.31–10.16)

## 2025-01-21 PROCEDURE — 96374 THER/PROPH/DIAG INJ IV PUSH: CPT

## 2025-01-21 PROCEDURE — 84484 ASSAY OF TROPONIN QUANT: CPT

## 2025-01-21 PROCEDURE — 93005 ELECTROCARDIOGRAM TRACING: CPT

## 2025-01-21 PROCEDURE — 83690 ASSAY OF LIPASE: CPT

## 2025-01-21 PROCEDURE — 84703 CHORIONIC GONADOTROPIN ASSAY: CPT

## 2025-01-21 PROCEDURE — 71046 X-RAY EXAM CHEST 2 VIEWS: CPT

## 2025-01-21 PROCEDURE — 36415 COLL VENOUS BLD VENIPUNCTURE: CPT

## 2025-01-21 PROCEDURE — 85025 COMPLETE CBC W/AUTO DIFF WBC: CPT

## 2025-01-21 PROCEDURE — 99285 EMERGENCY DEPT VISIT HI MDM: CPT

## 2025-01-21 PROCEDURE — 80053 COMPREHEN METABOLIC PANEL: CPT

## 2025-01-21 PROCEDURE — 96361 HYDRATE IV INFUSION ADD-ON: CPT

## 2025-01-21 PROCEDURE — 99284 EMERGENCY DEPT VISIT MOD MDM: CPT

## 2025-01-21 RX ORDER — ESOMEPRAZOLE MAGNESIUM 40 MG/1
40 CAPSULE, DELAYED RELEASE ORAL DAILY
Qty: 14 CAPSULE | Refills: 0 | Status: SHIPPED | OUTPATIENT
Start: 2025-01-21

## 2025-01-21 RX ORDER — PANTOPRAZOLE SODIUM 40 MG/10ML
40 INJECTION, POWDER, LYOPHILIZED, FOR SOLUTION INTRAVENOUS ONCE
Status: COMPLETED | OUTPATIENT
Start: 2025-01-21 | End: 2025-01-21

## 2025-01-21 RX ORDER — SUCRALFATE 1 G/1
1 TABLET ORAL ONCE
Status: COMPLETED | OUTPATIENT
Start: 2025-01-21 | End: 2025-01-21

## 2025-01-21 RX ORDER — MAGNESIUM HYDROXIDE/ALUMINUM HYDROXICE/SIMETHICONE 120; 1200; 1200 MG/30ML; MG/30ML; MG/30ML
30 SUSPENSION ORAL ONCE
Status: COMPLETED | OUTPATIENT
Start: 2025-01-21 | End: 2025-01-21

## 2025-01-21 RX ADMIN — ALUMINUM HYDROXIDE, MAGNESIUM HYDROXIDE, AND DIMETHICONE 30 ML: 200; 20; 200 SUSPENSION ORAL at 20:47

## 2025-01-21 RX ADMIN — PANTOPRAZOLE SODIUM 40 MG: 40 INJECTION, POWDER, FOR SOLUTION INTRAVENOUS at 20:47

## 2025-01-21 RX ADMIN — SODIUM CHLORIDE 1000 ML: 0.9 INJECTION, SOLUTION INTRAVENOUS at 20:52

## 2025-01-21 RX ADMIN — SUCRALFATE 1 G: 1 TABLET ORAL at 20:47

## 2025-01-22 LAB
ATRIAL RATE: 62 BPM
P AXIS: 33 DEGREES
PR INTERVAL: 194 MS
QRS AXIS: 70 DEGREES
QRSD INTERVAL: 84 MS
QT INTERVAL: 384 MS
QTC INTERVAL: 389 MS
T WAVE AXIS: 50 DEGREES
VENTRICULAR RATE: 62 BPM

## 2025-01-22 PROCEDURE — 93010 ELECTROCARDIOGRAM REPORT: CPT | Performed by: INTERNAL MEDICINE

## 2025-01-22 NOTE — DISCHARGE INSTRUCTIONS
Follow-up with PCP.  Take medicine as directed.  Continue to monitor symptoms at home.  Rest and hydrate.  If you develop any chest pain, shortness of breath, coughing up blood, dark stool or blood in stool return to the ER immediately.

## 2025-01-22 NOTE — ED PROVIDER NOTES
Time reflects when diagnosis was documented in both MDM as applicable and the Disposition within this note       Time User Action Codes Description Comment    1/21/2025 10:01 PM Alexx Wisdom Add [R10.9] Abdominal pain     1/21/2025 10:01 PM Alexx Wisdom Add [K21.9] GERD (gastroesophageal reflux disease)           ED Disposition       ED Disposition   Discharge    Condition   Stable    Date/Time   Tue Jan 21, 2025 10:02 PM    Comment   Kacie Mireya discharge to home/self care.                   Assessment & Plan       Medical Decision Making  Abdominal exam without peritoneal signs. No evidence of acute abdomen at this time. Well appearing. Given work up, low suspicion for acute hepatobiliary disease (including acute cholecystitis or cholangitis), acute pancreatitis (neg lipase), PUD (including gastric perforation), acute infectious processes (pneumonia, hepatitis, pyelonephritis), acute appendicitis, vascular catastrophe, bowel obstruction, viscus perforation, or genital torsion, diverticulitis. No lower abdominal pain at all.  Patient EKG appears similar to her previous one with no acute findings.  Patient reports pain is now 0 after medication and fluids.  Shared decision-making was had with the patient to obtain CT versus no CT and patient would like to forego this at this time.  Will prescribe medication.  Discussed strict return parameters and what to look for as far as emergent intra-abdominal and chest pain related emergencies.  Patient understands and agrees.Prior to discharge, discharge instructions were discussed with patient at bedside. Patient was provided both verbal and written instructions. Patient is understanding of the discharge instructions and is agreeable to plan of care. Return precautions were discussed with patient bedside, patient verbalized understanding of signs and symptoms that would necessitate return to the ED. All questions were answered. Patient was comfortable with the plan  of care and discharged to home.       Problems Addressed:  Abdominal pain: acute illness or injury  GERD (gastroesophageal reflux disease): acute illness or injury    Amount and/or Complexity of Data Reviewed  Labs: ordered. Decision-making details documented in ED Course.  Radiology: ordered and independent interpretation performed.    Risk  OTC drugs.  Prescription drug management.        ED Course as of 01/21/25 2318 Tue Jan 21, 2025   2106 PREGNANCY, SERUM: Negative   2149 hs TnI 0hr: <2   2200 Patient reports feeling much improved with pain being 0 currently.  Surgery is making was had with the patient and  bedside and she would not like to have any imaging done at this time.  Patient will continue to monitor closely at home.  Will send medications to pharmacy and she will follow-up close with PCP.       Medications   sodium chloride 0.9 % bolus 1,000 mL (0 mL Intravenous Stopped 1/21/25 2215)   pantoprazole (PROTONIX) injection 40 mg (40 mg Intravenous Given 1/21/25 2047)   aluminum-magnesium hydroxide-simethicone (MAALOX) oral suspension 30 mL (30 mL Oral Given 1/21/25 2047)   sucralfate (CARAFATE) tablet 1 g (1 g Oral Given 1/21/25 2047)       ED Risk Strat Scores   HEART Risk Score      Flowsheet Row Most Recent Value   Heart Score Risk Calculator    History 0 Filed at: 01/21/2025 2317   ECG 1 Filed at: 01/21/2025 2317   Age 0 Filed at: 01/21/2025 2317   Risk Factors 1 Filed at: 01/21/2025 2317   Troponin 1 Filed at: 01/21/2025 2317   HEART Score 3 Filed at: 01/21/2025 2317          HEART Risk Score      Flowsheet Row Most Recent Value   Heart Score Risk Calculator    History 0 Filed at: 01/21/2025 2317   ECG 1 Filed at: 01/21/2025 2317   Age 0 Filed at: 01/21/2025 2317   Risk Factors 1 Filed at: 01/21/2025 2317   Troponin 1 Filed at: 01/21/2025 2317   HEART Score 3 Filed at: 01/21/2025 2317                            SBIRT 20yo+      Flowsheet Row Most Recent Value   Initial Alcohol Screen: US  AUDIT-C     1. How often do you have a drink containing alcohol? 0 Filed at: 2025   2. How many drinks containing alcohol do you have on a typical day you are drinking?  0 Filed at: 2025   3a. Male UNDER 65: How often do you have five or more drinks on one occasion? 0 Filed at: 2025   3b. FEMALE Any Age, or MALE 65+: How often do you have 4 or more drinks on one occassion? 0 Filed at: 2025   Audit-C Score 0 Filed at: 2025   BOBBY: How many times in the past year have you...    Used an illegal drug or used a prescription medication for non-medical reasons? Never Filed at: 2025                            History of Present Illness       Chief Complaint   Patient presents with    Abdominal Pain     Generalized abd pain starting this morning. Denies n/v/d/fever       Past Medical History:   Diagnosis Date    GERD (gastroesophageal reflux disease)     Sciatica of left side       Past Surgical History:   Procedure Laterality Date    BREAST LUMPECTOMY Right 2021    Procedure: BREAST JIMBO  DIRECTED LUMPECTOMY;  Surgeon: Dani Moore MD;  Location: [facility];  Service: Surgical Oncology     SECTION      EGD      US BREAST CLIP NEEDLE LOC RIGHT Right 2021    US GUIDED BREAST BIOPSY RIGHT COMPLETE Right 2021      Family History   Problem Relation Age of Onset    No Known Problems Mother     Heart attack Father     Heart disease Father     No Known Problems Sister     No Known Problems Maternal Grandmother     No Known Problems Maternal Grandfather     No Known Problems Paternal Grandmother     No Known Problems Paternal Grandfather     No Known Problems Son     Breast cancer Maternal Aunt 93    BRCA2 Positive Neg Hx     BRCA1 Positive Neg Hx     BRCA2 Negative Neg Hx     BRCA1 Negative Neg Hx     BRCA 1/2 Neg Hx     Ovarian cancer Neg Hx     Endometrial cancer Neg Hx     Colon cancer Neg Hx     Breast cancer additional onset Neg Hx        Social History     Tobacco Use    Smoking status: Never    Smokeless tobacco: Never   Vaping Use    Vaping status: Never Used   Substance Use Topics    Alcohol use: No    Drug use: Never      E-Cigarette/Vaping    E-Cigarette Use Never User       E-Cigarette/Vaping Substances    Nicotine No     THC No     CBD No     Flavoring No     Other No     Unknown No       I have reviewed and agree with the history as documented.     The patient is a 44 y.o. female with a history of GERD and sciatica who presents to [facility] with a chief complaint of abdominal pain. Symptoms began this morning and have been constant since onset. Her pain is currently rated as a 5/10 in severity and described as epigastric abdominal pain with radiation downward. Associated symptoms include dyspepsia. Symptoms are aggravated with food and alleviating factors include none noted. The patient denies fever, chills, night sweats, chest pain, shortness of breath, cough, sputum, dizziness, numbness, tingling, syncope, blurred vision, double vision, vomiting, nausea, diarrhea, dysuria, frequency, urgency, hesitancy. No other reported symptoms at this time.  Patient denies allergies to anything  Patient reports that she took 3 prescription take ibuprofen yesterday and a few hours later had some epigastric burning and pain          History provided by:  Patient   used: No    Abdominal Pain  Associated symptoms: no chest pain, no chills, no constipation, no cough, no diarrhea, no dysuria, no fever, no hematuria, no nausea, no shortness of breath, no sore throat and no vomiting        Review of Systems   Constitutional:  Negative for chills and fever.   HENT:  Negative for ear pain and sore throat.    Eyes:  Negative for pain and visual disturbance.   Respiratory:  Negative for cough, shortness of breath, wheezing and stridor.    Cardiovascular:  Negative for chest pain and palpitations.   Gastrointestinal:   Positive for abdominal pain. Negative for anal bleeding, blood in stool, constipation, diarrhea, nausea, rectal pain and vomiting.   Genitourinary:  Negative for dysuria and hematuria.   Musculoskeletal:  Negative for arthralgias and back pain.   Skin:  Negative for color change and rash.   Neurological:  Negative for dizziness, seizures, syncope, facial asymmetry, light-headedness and headaches.   All other systems reviewed and are negative.          Objective       ED Triage Vitals [01/21/25 1855]   Temperature Pulse Blood Pressure Respirations SpO2 Patient Position - Orthostatic VS   98 °F (36.7 °C) 74 117/56 20 99 % Sitting      Temp Source Heart Rate Source BP Location FiO2 (%) Pain Score    Temporal Monitor Left arm -- --      Vitals      Date and Time Temp Pulse SpO2 Resp BP Pain Score FACES Pain Rating User   01/21/25 2215 -- 67 100 % 18 99/51 -- -- CZ   01/21/25 1855 98 °F (36.7 °C) 74 99 % 20 117/56 -- -- RO            Physical Exam  Vitals reviewed.   Constitutional:       General: She is not in acute distress.     Appearance: She is not ill-appearing or toxic-appearing.   HENT:      Head: Normocephalic.   Eyes:      General: No scleral icterus.     Extraocular Movements: Extraocular movements intact.   Cardiovascular:      Rate and Rhythm: Normal rate.   Pulmonary:      Effort: Pulmonary effort is normal. No respiratory distress.      Breath sounds: No stridor. No wheezing, rhonchi or rales.   Chest:      Chest wall: No tenderness.   Abdominal:      General: Abdomen is flat.      Palpations: Abdomen is soft.      Tenderness: There is abdominal tenderness in the epigastric area. There is no right CVA tenderness or left CVA tenderness. Negative signs include Nguyen's sign, Rovsing's sign, McBurney's sign and psoas sign.      Hernia: No hernia is present.   Skin:     General: Skin is warm and dry.      Capillary Refill: Capillary refill takes less than 2 seconds.      Coloration: Skin is not cyanotic or  mottled.      Findings: No erythema.   Neurological:      General: No focal deficit present.      Mental Status: She is alert and oriented to person, place, and time.         Results Reviewed       Procedure Component Value Units Date/Time    HS Troponin 0hr (reflex protocol) [[id number]]  (Normal) Collected: 01/21/25 2100    Lab Status: Final result Specimen: Blood from Arm, Left Updated: 01/21/25 2146     hs TnI 0hr <2 ng/L     hCG, qualitative pregnancy [[id number]]  (Normal) Collected: 01/21/25 1900    Lab Status: Final result Specimen: Blood from Arm, Left Updated: 01/21/25 2106     Preg, Serum Negative    Comprehensive metabolic panel [[id number]]  (Abnormal) Collected: 01/21/25 1900    Lab Status: Final result Specimen: Blood from Arm, Left Updated: 01/21/25 1924     Sodium 137 mmol/L      Potassium 3.6 mmol/L      Chloride 105 mmol/L      CO2 24 mmol/L      ANION GAP 8 mmol/L      BUN 9 mg/dL      Creatinine 0.45 mg/dL      Glucose 87 mg/dL      Calcium 9.9 mg/dL      AST 17 U/L      ALT 13 U/L      Alkaline Phosphatase 65 U/L      Total Protein 7.5 g/dL      Albumin 4.4 g/dL      Total Bilirubin 0.59 mg/dL      eGFR 123 ml/min/1.73sq m     Narrative:      National Kidney Disease Foundation guidelines for Chronic Kidney Disease (CKD):     Stage 1 with normal or high GFR (GFR > 90 mL/min/1.73 square meters)    Stage 2 Mild CKD (GFR = 60-89 mL/min/1.73 square meters)    Stage 3A Moderate CKD (GFR = 45-59 mL/min/1.73 square meters)    Stage 3B Moderate CKD (GFR = 30-44 mL/min/1.73 square meters)    Stage 4 Severe CKD (GFR = 15-29 mL/min/1.73 square meters)    Stage 5 End Stage CKD (GFR <15 mL/min/1.73 square meters)  Note: GFR calculation is accurate only with a steady state creatinine    Lipase [[id number]]  (Normal) Collected: 01/21/25 1900    Lab Status: Final result Specimen: Blood from Arm, Left Updated: 01/21/25 1924     Lipase 27 u/L     CBC and differential [[id number]]  (Abnormal) Collected: 01/21/25 1900     Lab Status: Final result Specimen: Blood from Arm, Left Updated: 01/21/25 1907     WBC 10.26 Thousand/uL      RBC 4.38 Million/uL      Hemoglobin 12.7 g/dL      Hematocrit 38.5 %      MCV 88 fL      MCH 29.0 pg      MCHC 33.0 g/dL      RDW 13.0 %      MPV 10.0 fL      Platelets 310 Thousands/uL      nRBC 0 /100 WBCs      Segmented % 61 %      Immature Grans % 0 %      Lymphocytes % 29 %      Monocytes % 8 %      Eosinophils Relative 1 %      Basophils Relative 1 %      Absolute Neutrophils 6.26 Thousands/µL      Absolute Immature Grans 0.03 Thousand/uL      Absolute Lymphocytes 3.00 Thousands/µL      Absolute Monocytes 0.81 Thousand/µL      Eosinophils Absolute 0.10 Thousand/µL      Basophils Absolute 0.06 Thousands/µL             XR chest 2 views   ED Interpretation by Alexx Wisdom PA-C (01/21 2043)   No acute cardiopulmonary disease      Final Interpretation by Jose Matos MD (01/21 2050)      No acute cardiopulmonary disease.            Workstation performed: [id number]             Procedures    ED Medication and Procedure Management   Prior to Admission Medications   Prescriptions Last Dose Informant Patient Reported? Taking?   ascorbic acid (VITAMIN C) 1000 MG tablet  Self Yes No   Sig: Take by mouth as needed   oxyCODONE-acetaminophen (PERCOCET) 5-325 mg per tablet   No No   Sig: Take 1 tablet by mouth every 6 (six) hours as needed for severe pain or moderate pain for up to 20 doses Max Daily Amount: 4 tablets   predniSONE 50 mg tablet   No No   Sig: Take 1 tablet (50 mg total) by mouth daily Do not start before December 13, 2024.      Facility-Administered Medications: None     Discharge Medication List as of 1/21/2025 10:03 PM        START taking these medications    Details   aluminum-magnesium hydroxide 200-200 MG/5ML suspension Take 15 mL by mouth every 6 (six) hours as needed for heartburn, Starting Tue 1/21/2025, Normal      esomeprazole (NexIUM) 40 MG capsule Take 1 capsule (40 mg  total) by mouth in the morning 30-1hr min prior to breakfast, Starting Tue 1/21/2025, Normal           CONTINUE these medications which have NOT CHANGED    Details   ascorbic acid (VITAMIN C) 1000 MG tablet Take by mouth as needed, Historical Med      oxyCODONE-acetaminophen (PERCOCET) 5-325 mg per tablet Take 1 tablet by mouth every 6 (six) hours as needed for severe pain or moderate pain for up to 20 doses Max Daily Amount: 4 tablets, Starting Fri 3/1/2024, Normal      predniSONE 50 mg tablet Take 1 tablet (50 mg total) by mouth daily Do not start before December 13, 2024., Starting Fri 12/13/2024, Normal           No discharge procedures on file.  ED SEPSIS DOCUMENTATION   Time reflects when diagnosis was documented in both MDM as applicable and the Disposition within this note       Time User Action Codes Description Comment    1/21/2025 10:01 PM Alexx Wisdom [R10.9] Abdominal pain     1/21/2025 10:01 PM Alexx Wisdom [K21.9] GERD (gastroesophageal reflux disease)                  Alexx Wisdom PA-C  01/21/25 1228       Alexx Wisdom PA-C  01/21/25 1458

## 2025-03-07 PROBLEM — N64.4 BREAST PAIN: Status: ACTIVE | Noted: 2025-03-07

## 2025-03-07 PROBLEM — Z98.890 HISTORY OF LUMPECTOMY OF RIGHT BREAST: Status: ACTIVE | Noted: 2025-03-07

## 2025-03-10 ENCOUNTER — OFFICE VISIT (OUTPATIENT)
Dept: SURGICAL ONCOLOGY | Facility: CLINIC | Age: 45
End: 2025-03-10
Payer: COMMERCIAL

## 2025-03-10 VITALS
HEIGHT: 69 IN | TEMPERATURE: 98.2 F | WEIGHT: 161 LBS | BODY MASS INDEX: 23.85 KG/M2 | DIASTOLIC BLOOD PRESSURE: 74 MMHG | OXYGEN SATURATION: 97 % | SYSTOLIC BLOOD PRESSURE: 106 MMHG | HEART RATE: 80 BPM

## 2025-03-10 DIAGNOSIS — Z98.890 HISTORY OF LUMPECTOMY OF RIGHT BREAST: ICD-10-CM

## 2025-03-10 DIAGNOSIS — N64.4 BREAST PAIN: Primary | ICD-10-CM

## 2025-03-10 PROCEDURE — 99244 OFF/OP CNSLTJ NEW/EST MOD 40: CPT | Performed by: SURGERY

## 2025-03-10 NOTE — PROGRESS NOTES
Surgical Oncology Follow Up  [facility]  [facility] [place]  [address] [id number]    Kacie Gomes  1980  [id number]      Chief Complaint   Patient presents with   • Consult     New Breast Pain        Assessment & Plan:   Is a 45-year-old female with history of right breast lumpectomy.  She is coming with bilateral discomfort prior to her menstrual period.  Clinically there are no suspected mass or masses.  However she has not gone for mammogram for last 2 years.  Will order her screening mammogram and see her after.    Cancer History:     Oncology History    No history exists.         Interval History:   She is here for follow-up with history of right breast lumpectomy.    Review of Systems:   Review of Systems   Constitutional:  Negative for chills and fever.   HENT:  Negative for ear pain and sore throat.    Eyes:  Negative for pain and visual disturbance.   Respiratory:  Negative for cough and shortness of breath.    Cardiovascular:  Negative for chest pain and palpitations.   Gastrointestinal:  Negative for abdominal pain and vomiting.   Genitourinary:  Negative for dysuria and hematuria.   Musculoskeletal:  Negative for arthralgias and back pain.   Skin:  Negative for color change and rash.   Neurological:  Negative for seizures and syncope.   All other systems reviewed and are negative.    Past Medical History     Patient Active Problem List   Diagnosis   • Non-ulcer dyspepsia   • Sciatica of left side   • Menorrhagia with regular cycle   • Intramural and subserous leiomyoma of uterus   • Subserous leiomyoma of uterus   • Frequency of urination   • GERD (gastroesophageal reflux disease)   • Breast pain   • History of lumpectomy of right breast     Past Medical History:   Diagnosis Date   • GERD (gastroesophageal reflux disease)    • Sciatica of left side      Past Surgical History:   Procedure Laterality Date   • BREAST LUMPECTOMY Right  2021    Procedure: BREAST JIMBO  DIRECTED LUMPECTOMY;  Surgeon: Dani Moore MD;  Location[facility];  Service: Surgical Oncology   •  SECTION     • EGD     • US BREAST CLIP NEEDLE LOC RIGHT Right 2021   • US GUIDED BREAST BIOPSY RIGHT COMPLETE Right 2021     Family History   Problem Relation Age of Onset   • No Known Problems Mother    • Heart attack Father    • Heart disease Father    • No Known Problems Sister    • No Known Problems Maternal Grandmother    • No Known Problems Maternal Grandfather    • No Known Problems Paternal Grandmother    • No Known Problems Paternal Grandfather    • No Known Problems Son    • Breast cancer Maternal Aunt 93   • BRCA2 Positive Neg Hx    • BRCA1 Positive Neg Hx    • BRCA2 Negative Neg Hx    • BRCA1 Negative Neg Hx    • BRCA 1/2 Neg Hx    • Ovarian cancer Neg Hx    • Endometrial cancer Neg Hx    • Colon cancer Neg Hx    • Breast cancer additional onset Neg Hx      Social History     Socioeconomic History   • Marital status: /Civil Union     Spouse name: Not on file   • Number of children: Not on file   • Years of education: Not on file   • Highest education level: Not on file   Occupational History   • Not on file   Tobacco Use   • Smoking status: Never   • Smokeless tobacco: Never   Vaping Use   • Vaping status: Never Used   Substance and Sexual Activity   • Alcohol use: No   • Drug use: Never   • Sexual activity: Yes     Partners: Male   Other Topics Concern   • Not on file   Social History Narrative   • Not on file     Social Drivers of Health     Financial Resource Strain: Not on file   Food Insecurity: Not on file   Transportation Needs: Not on file   Physical Activity: Sufficiently Active (2023)    Exercise Vital Sign    • Days of Exercise per Week: 7 days    • Minutes of Exercise per Session: 60 min   Stress: No Stress Concern Present (12/3/2021)    [employer] - Occupational Stress Questionnaire    •  Feeling of Stress : Not at all   Social Connections: Not on file   Intimate Partner Violence: Not on file   Housing Stability: Not on file       Current Outpatient Medications:   •  ascorbic acid (VITAMIN C) 1000 MG tablet, Take by mouth as needed, Disp: , Rfl:   •  aluminum-magnesium hydroxide 200-200 MG/5ML suspension, Take 15 mL by mouth every 6 (six) hours as needed for heartburn (Patient not taking: Reported on 3/10/2025), Disp: 355 mL, Rfl: 0  •  esomeprazole (NexIUM) 40 MG capsule, Take 1 capsule (40 mg total) by mouth in the morning 30-1hr min prior to breakfast (Patient not taking: Reported on 3/10/2025), Disp: 14 capsule, Rfl: 0  •  oxyCODONE-acetaminophen (PERCOCET) 5-325 mg per tablet, Take 1 tablet by mouth every 6 (six) hours as needed for severe pain or moderate pain for up to 20 doses Max Daily Amount: 4 tablets (Patient not taking: Reported on 3/10/2025), Disp: 20 tablet, Rfl: 0  •  predniSONE 50 mg tablet, Take 1 tablet (50 mg total) by mouth daily Do not start before December 13, 2024. (Patient not taking: Reported on 3/10/2025), Disp: 4 tablet, Rfl: 0  No Known Allergies    Physical Exam:     Vitals:    03/10/25 1452   BP: 106/74   Pulse: 80   Temp: 98.2 °F (36.8 °C)   SpO2: 97%     Physical Exam  Constitutional:       Appearance: Normal appearance.   HENT:      Head: Normocephalic and atraumatic.      Nose: Nose normal.      Mouth/Throat:      Mouth: Mucous membranes are moist.   Eyes:      Pupils: Pupils are equal, round, and reactive to light.   Cardiovascular:      Rate and Rhythm: Normal rate.      Pulses: Normal pulses.      Heart sounds: Normal heart sounds.   Pulmonary:      Effort: Pulmonary effort is normal.      Breath sounds: Normal breath sounds.   Chest:          Comments: Right breast upper outer quadrant well-healed surgical scar.  Bilateral breast examination no palpable mass masses nipple discharge nipple retraction or skin changes.  Bilateral axillary and supraclavicular  examination no palpable adenopathy.  Patient was examined seated as well as supine position.  Abdominal:      General: Bowel sounds are normal.      Palpations: Abdomen is soft.   Musculoskeletal:         General: Normal range of motion.      Cervical back: Normal range of motion and neck supple.   Skin:     General: Skin is warm.   Neurological:      General: No focal deficit present.      Mental Status: She is alert and oriented to person, place, and time.   Psychiatric:         Mood and Affect: Mood normal.         Behavior: Behavior normal.         Thought Content: Thought content normal.         Judgment: Judgment normal.       Results & Discussion:   I did review her right breast surgery pathology, more most recent mammogram 3 years ago.  We will order her next mammogram and see her.  I did discussed in detail nature of breast discomfort pain prior to her..  Self breast examination and annual need for screening mammogram were reviewed and discussed.  Will follow her after her next mammogram.  she understands and  agrees . All patient questions were answered.       Advance Care Planning/Advance Directives:  I Lainey Sheridan MD discussed the disease status with Kacie Gomes  today 03/10/25  treatment plans and follow-up with the patient.

## 2025-03-11 ENCOUNTER — TELEPHONE (OUTPATIENT)
Dept: MAMMOGRAPHY | Facility: CLINIC | Age: 45
End: 2025-03-11

## 2025-03-12 ENCOUNTER — TELEPHONE (OUTPATIENT)
Dept: MAMMOGRAPHY | Facility: CLINIC | Age: 45
End: 2025-03-12

## 2025-03-25 ENCOUNTER — HOSPITAL ENCOUNTER (OUTPATIENT)
Dept: MAMMOGRAPHY | Facility: CLINIC | Age: 45
Discharge: HOME | End: 2025-03-25
Payer: COMMERCIAL

## 2025-03-25 ENCOUNTER — HOSPITAL ENCOUNTER (OUTPATIENT)
Dept: ULTRASOUND IMAGING | Facility: CLINIC | Age: 45
Discharge: HOME | End: 2025-03-25
Payer: COMMERCIAL

## 2025-03-25 VITALS — HEIGHT: 69 IN | WEIGHT: 161 LBS | BODY MASS INDEX: 23.85 KG/M2

## 2025-03-25 DIAGNOSIS — N64.4 BREAST PAIN: ICD-10-CM

## 2025-03-25 PROCEDURE — 76642 ULTRASOUND BREAST LIMITED: CPT

## 2025-03-25 PROCEDURE — G0279 TOMOSYNTHESIS, MAMMO: HCPCS

## 2025-03-25 PROCEDURE — 77066 DX MAMMO INCL CAD BI: CPT

## 2025-04-11 ENCOUNTER — TELEPHONE (OUTPATIENT)
Dept: FAMILY MEDICINE CLINIC | Facility: CLINIC | Age: 45
End: 2025-04-11

## 2025-04-14 ENCOUNTER — OFFICE VISIT (OUTPATIENT)
Dept: SURGICAL ONCOLOGY | Facility: CLINIC | Age: 45
End: 2025-04-14
Payer: COMMERCIAL

## 2025-04-14 VITALS
HEART RATE: 84 BPM | DIASTOLIC BLOOD PRESSURE: 62 MMHG | BODY MASS INDEX: 22.51 KG/M2 | WEIGHT: 152 LBS | OXYGEN SATURATION: 95 % | TEMPERATURE: 98.4 F | HEIGHT: 69 IN | SYSTOLIC BLOOD PRESSURE: 94 MMHG

## 2025-04-14 DIAGNOSIS — Q83.1 ACCESSORY BREAST TISSUE OF AXILLA: ICD-10-CM

## 2025-04-14 DIAGNOSIS — N64.4 BREAST PAIN: Primary | ICD-10-CM

## 2025-04-14 DIAGNOSIS — Z98.890 HISTORY OF LUMPECTOMY OF RIGHT BREAST: ICD-10-CM

## 2025-04-14 PROCEDURE — 99215 OFFICE O/P EST HI 40 MIN: CPT | Performed by: SURGERY

## 2025-04-14 NOTE — PROGRESS NOTES
Name: Kacie Gomes      : 1980      MRN: [id number]  Encounter Provider: Lainey Sheridan MD  Encounter Date: 2025   Encounter department: CANCER [facility] SURGICAL ONCOL[facility] [place]  :  Assessment & Plan  Breast pain         History of lumpectomy of right breast         Accessory breast tissue of axilla         45-year-old female with left axillary pain with accessory breast tissue.  She states that the pain is worse throughout her menstruation and improves better.  She is here after ultrasound left axilla and mammogram.  These were reviewed and discussed there are no worrisome features.  Options were discussed including surgical excision versus observation.  She will continue observation and will take pain medication NSAID as necessary.  I will see her in 6 months to reevaluate.  She was told if she decides for excision to relieve symptoms she was asked to call us we will bring her sooner.  She also has upcoming surgery for fibroids in her uterus in  as well.        History of Present Illness   Kacie Gomes is a 45 y.o. year old female who presents with left axillary discomfort with accessory breast tissue post mammogram and ultrasound.       Review of Systems   Constitutional:  Negative for chills and fever.   HENT:  Negative for ear pain and sore throat.    Eyes:  Negative for pain and visual disturbance.   Respiratory:  Negative for cough and shortness of breath.    Cardiovascular:  Negative for chest pain and palpitations.   Gastrointestinal:  Negative for abdominal pain and vomiting.   Genitourinary:  Negative for dysuria and hematuria.   Musculoskeletal:  Negative for arthralgias and back pain.   Skin:  Negative for color change and rash.   Neurological:  Negative for seizures and syncope.   All other systems reviewed and are negative.   A complete review of systems is negative other than that noted above in the HPI.    Medical History Reviewed by provider this  "encounter:  Tobacco  Allergies  Meds  Problems  Med Hx  Surg Hx  Fam Hx     .       Objective   BP 94/62 (BP Location: Right arm, Patient Position: Sitting)   Pulse 84   Temp 98.4 °F (36.9 °C) (Temporal)   Ht 5' 9\" (1.753 m)   Wt 68.9 kg (152 lb)   LMP 03/01/2025 (Exact Date)   SpO2 95%   BMI 22.45 kg/m²     Pain Screening:     ECOG    Physical Exam  Constitutional:       Appearance: Normal appearance.   HENT:      Head: Normocephalic and atraumatic.      Nose: Nose normal.      Mouth/Throat:      Mouth: Mucous membranes are moist.   Eyes:      Pupils: Pupils are equal, round, and reactive to light.   Cardiovascular:      Rate and Rhythm: Normal rate.      Pulses: Normal pulses.      Heart sounds: Normal heart sounds.   Pulmonary:      Effort: Pulmonary effort is normal.      Breath sounds: Normal breath sounds.   Chest:          Comments: Bilateral breast examination no palpable mass masses nipple discharge nipple retraction or skin changes left axillary accessory breast tissue.  Bilateral axillary and supraclavicular examination no palpable adenopathy.  Patient was examined seated as well as supine position  Abdominal:      General: Bowel sounds are normal.      Palpations: Abdomen is soft.   Musculoskeletal:         General: Normal range of motion.      Cervical back: Normal range of motion and neck supple.   Skin:     General: Skin is warm.   Neurological:      General: No focal deficit present.      Mental Status: She is alert and oriented to person, place, and time.   Psychiatric:         Mood and Affect: Mood normal.         Behavior: Behavior normal.         Thought Content: Thought content normal.         Judgment: Judgment normal.          Labs: I have reviewed pertinent labs.   Lab Results   Component Value Date/Time    WBC 10.26 (H) 01/21/2025 07:00 PM    RBC 4.38 01/21/2025 07:00 PM    Hemoglobin 12.7 01/21/2025 07:00 PM    Hematocrit 38.5 01/21/2025 07:00 PM    MCV 88 01/21/2025 07:00 PM "    MCH 29.0 01/21/2025 07:00 PM    RDW 13.0 01/21/2025 07:00 PM    Platelets 310 01/21/2025 07:00 PM    Segmented % 61 01/21/2025 07:00 PM    Lymphocytes % 29 01/21/2025 07:00 PM    Monocytes % 8 01/21/2025 07:00 PM    Eosinophils Relative 1 01/21/2025 07:00 PM    Basophils Relative 1 01/21/2025 07:00 PM    Immature Grans % 0 01/21/2025 07:00 PM    Absolute Neutrophils 6.26 01/21/2025 07:00 PM      Lab Results   Component Value Date/Time    Sodium 137 01/21/2025 07:00 PM    Potassium 3.6 01/21/2025 07:00 PM    Chloride 105 01/21/2025 07:00 PM    CO2 24 01/21/2025 07:00 PM    ANION GAP 8 01/21/2025 07:00 PM    BUN 9 01/21/2025 07:00 PM    Creatinine 0.45 (L) 01/21/2025 07:00 PM    Glucose 87 01/21/2025 07:00 PM    Calcium 9.9 01/21/2025 07:00 PM    AST 17 01/21/2025 07:00 PM    ALT 13 01/21/2025 07:00 PM    Alkaline Phosphatase 65 01/21/2025 07:00 PM    Total Protein 7.5 01/21/2025 07:00 PM    Albumin 4.4 01/21/2025 07:00 PM    Total Bilirubin 0.59 01/21/2025 07:00 PM    eGFR 123 01/21/2025 07:00 PM      CBC/BMP:   Lab Results   Component Value Date/Time    WBC 10.26 (H) 01/21/2025 07:00 PM    Hemoglobin 12.7 01/21/2025 07:00 PM    Hematocrit 38.5 01/21/2025 07:00 PM    Platelets 310 01/21/2025 07:00 PM    Sodium 137 01/21/2025 07:00 PM    Potassium 3.6 01/21/2025 07:00 PM    Chloride 105 01/21/2025 07:00 PM    CO2 24 01/21/2025 07:00 PM    BUN 9 01/21/2025 07:00 PM    Creatinine 0.45 (L) 01/21/2025 07:00 PM    Glucose 87 01/21/2025 07:00 PM      No visits with results within 1 Month(s) from this visit.   Latest known visit with results is:   Admission on 01/21/2025, Discharged on 01/21/2025   Component Date Value Ref Range Status    WBC 01/21/2025 10.26 (H)  4.31 - 10.16 Thousand/uL Final    RBC 01/21/2025 4.38  3.81 - 5.12 Million/uL Final    Hemoglobin 01/21/2025 12.7  11.5 - 15.4 g/dL Final    Hematocrit 01/21/2025 38.5  34.8 - 46.1 % Final    MCV 01/21/2025 88  82 - 98 fL Final    MCH 01/21/2025 29.0  26.8 -  34.3 pg Final    MCHC 01/21/2025 33.0  31.4 - 37.4 g/dL Final    RDW 01/21/2025 13.0  11.6 - 15.1 % Final    MPV 01/21/2025 10.0  8.9 - 12.7 fL Final    Platelets 01/21/2025 310  149 - 390 Thousands/uL Final    nRBC 01/21/2025 0  /100 WBCs Final    Segmented % 01/21/2025 61  43 - 75 % Final    Immature Grans % 01/21/2025 0  0 - 2 % Final    Lymphocytes % 01/21/2025 29  14 - 44 % Final    Monocytes % 01/21/2025 8  4 - 12 % Final    Eosinophils Relative 01/21/2025 1  0 - 6 % Final    Basophils Relative 01/21/2025 1  0 - 1 % Final    Absolute Neutrophils 01/21/2025 6.26  1.85 - 7.62 Thousands/µL Final    Absolute Immature Grans 01/21/2025 0.03  0.00 - 0.20 Thousand/uL Final    Absolute Lymphocytes 01/21/2025 3.00  0.60 - 4.47 Thousands/µL Final    Absolute Monocytes 01/21/2025 0.81  0.17 - 1.22 Thousand/µL Final    Eosinophils Absolute 01/21/2025 0.10  0.00 - 0.61 Thousand/µL Final    Basophils Absolute 01/21/2025 0.06  0.00 - 0.10 Thousands/µL Final    Sodium 01/21/2025 137  135 - 147 mmol/L Final    Potassium 01/21/2025 3.6  3.5 - 5.3 mmol/L Final    Chloride 01/21/2025 105  96 - 108 mmol/L Final    CO2 01/21/2025 24  21 - 32 mmol/L Final    ANION GAP 01/21/2025 8  4 - 13 mmol/L Final    BUN 01/21/2025 9  5 - 25 mg/dL Final    Creatinine 01/21/2025 0.45 (L)  0.60 - 1.30 mg/dL Final    Standardized to IDMS reference method    Glucose 01/21/2025 87  65 - 140 mg/dL Final    If the patient is fasting, the ADA then defines impaired fasting glucose as > 100 mg/dL and diabetes as > or equal to 123 mg/dL.    Calcium 01/21/2025 9.9  8.4 - 10.2 mg/dL Final    AST 01/21/2025 17  13 - 39 U/L Final    ALT 01/21/2025 13  7 - 52 U/L Final    Specimen collection should occur prior to Sulfasalazine administration due to the potential for falsely depressed results.     Alkaline Phosphatase 01/21/2025 65  34 - 104 U/L Final    Total Protein 01/21/2025 7.5  6.4 - 8.4 g/dL Final    Albumin 01/21/2025 4.4  3.5 - 5.0 g/dL Final    Total  "Bilirubin 01/21/2025 0.59  0.20 - 1.00 mg/dL Final    Use of this assay is not recommended for patients undergoing treatment with eltrombopag due to the potential for falsely elevated results.  N-acetyl-p-benzoquinone imine (metabolite of Acetaminophen) will generate erroneously low results in samples for patients that have taken an overdose of Acetaminophen.    eGFR 01/21/2025 123  ml/min/1.73sq m Final    Lipase 01/21/2025 27  11 - 82 u/L Final    hs TnI 0hr 01/21/2025 <2  \"Refer to ACS Flowchart\"- see link ng/L Final    Comment:                                              Initial (time 0) result  If >=50 ng/L, Myocardial injury suggested ;  Type of myocardial injury and treatment strategy  to be determined.  If 5-49 ng/L, a delta result at 2 hours and or 4 hours will be needed to further evaluate.  If <4 ng/L, and chest pain has been >3 hours since onset, patient may qualify for discharge based on the HEART score in the ED.  If <5 ng/L and <3hours since onset of chest pain, a delta result at 2 hours will be needed to further evaluate.    HS Troponin 99th Percentile URL of a Health Population=12 ng/L with a 95% Confidence Interval of 8-18 ng/L.    Second Troponin (time 2 hours)  If calculated delta >= 20 ng/L,  Myocardial injury suggested ; Type of myocardial injury and treatment strategy to be determined.  If 5-49 ng/L and the calculated delta is 5-19 ng/L, consult medical service for evaluation.  Continue evaluation for ischemia on ecg and other possible etiology and repeat hs troponin at 4 hours.  If delta                            is <5 ng/L at 2 hours, consider discharge based on risk stratification via the HEART score (if in ED), or LEE risk score in IP/Observation.    HS Troponin 99th Percentile URL of a Health Population=12 ng/L with a 95% Confidence Interval of 8-18 ng/L.    Preg, Serum 01/21/2025 Negative  Negative Final    Ventricular Rate 01/21/2025 62  BPM Final    Atrial Rate 01/21/2025 62  BPM " Final    IA Interval 01/21/2025 194  ms Final    QRSD Interval 01/21/2025 84  ms Final    QT Interval 01/21/2025 384  ms Final    QTC Interval 01/21/2025 389  ms Final    P Axis 01/21/2025 33  degrees Final    QRS Axis 01/21/2025 70  degrees Final    T Wave [place] 01/21/2025 50  degrees Final     Pathology: I have reviewed pathology reports described above.    Radiology Results Review: I personally reviewed the following image studies in PACS and associated radiology reports: mammogram and Ultrasound(s). My interpretation of the radiology images/reports is: no concerns for malignancy..  [place]: yes    Administrative Statements   I have spent a total time of 40 minutes in caring for this patient on the day of the visit/encounter including Diagnostic results, Prognosis, Risks and benefits of tx options, Instructions for management, Patient and family education, Importance of tx compliance, Risk factor reductions, Impressions, Counseling / Coordination of care, Documenting in the medical record, Reviewing/placing orders in the medical record (including tests, medications, and/or procedures), and Obtaining or reviewing history  .

## (undated) DEVICE — SYRINGE 1ML TB 26G X 3/8 SAFETY

## (undated) DEVICE — BETHLEHEM UNIVERSAL MINOR GEN: Brand: CARDINAL HEALTH

## (undated) DEVICE — TUBING SUCTION 5MM X 12 FT

## (undated) DEVICE — CHLORAPREP HI-LITE 26ML ORANGE

## (undated) DEVICE — GLOVE SRG BIOGEL 7

## (undated) DEVICE — VIAL DECANTER

## (undated) DEVICE — SUT VICRYL 3-0 SH 27 IN J416H

## (undated) DEVICE — INTENDED FOR TISSUE SEPARATION, AND OTHER PROCEDURES THAT REQUIRE A SHARP SURGICAL BLADE TO PUNCTURE OR CUT.: Brand: BARD-PARKER SAFETY BLADES SIZE 15, STERILE

## (undated) DEVICE — 3M™ STERI-STRIP™ REINFORCED ADHESIVE SKIN CLOSURES, R1547, 1/2 IN X 4 IN (12 MM X 100 MM), 6 STRIPS/ENVELOPE: Brand: 3M™ STERI-STRIP™

## (undated) DEVICE — PENCIL ELECTROSURG E-Z CLEAN -0035H

## (undated) DEVICE — DRAPE EQUIPMENT RF WAND

## (undated) DEVICE — SMOKE EVACUATION TUBING WITH 8 IN INTEGRAL WAND AND SPONGE GUARD: Brand: BUFFALO FILTER

## (undated) DEVICE — SUT MONOCRYL 4-0 PS-2 18 IN Y496G

## (undated) DEVICE — LIGHT HANDLE COVER SLEEVE DISP BLUE STELLAR

## (undated) DEVICE — MEDI-VAC YANK SUCT HNDL W/TPRD BULBOUS TIP: Brand: CARDINAL HEALTH

## (undated) DEVICE — DRAPE PROBE NEO-PROBE/ULTRASOUND

## (undated) DEVICE — NEEDLE 25G X 1 1/2